# Patient Record
Sex: FEMALE | Race: BLACK OR AFRICAN AMERICAN | NOT HISPANIC OR LATINO | Employment: OTHER | URBAN - METROPOLITAN AREA
[De-identification: names, ages, dates, MRNs, and addresses within clinical notes are randomized per-mention and may not be internally consistent; named-entity substitution may affect disease eponyms.]

---

## 2021-10-26 RX ORDER — RNA INGREDIENT BNT-162B2 0.23 G/1.8ML
INJECTION, SUSPENSION INTRAMUSCULAR
Qty: 0.3 ML | Refills: 2 | OUTPATIENT
Start: 2021-10-26

## 2022-12-27 ENCOUNTER — OFFICE VISIT (OUTPATIENT)
Dept: FAMILY MEDICINE CLINIC | Facility: CLINIC | Age: 72
End: 2022-12-27

## 2022-12-27 VITALS
TEMPERATURE: 98.1 F | HEART RATE: 68 BPM | HEIGHT: 63 IN | BODY MASS INDEX: 46.42 KG/M2 | WEIGHT: 262 LBS | DIASTOLIC BLOOD PRESSURE: 88 MMHG | SYSTOLIC BLOOD PRESSURE: 142 MMHG | OXYGEN SATURATION: 99 %

## 2022-12-27 DIAGNOSIS — Z12.11 SCREEN FOR COLON CANCER: ICD-10-CM

## 2022-12-27 DIAGNOSIS — Z86.39 HISTORY OF HYPOTHYROIDISM: ICD-10-CM

## 2022-12-27 DIAGNOSIS — E66.01 MORBID (SEVERE) OBESITY DUE TO EXCESS CALORIES: ICD-10-CM

## 2022-12-27 DIAGNOSIS — E11.65 TYPE 2 DIABETES MELLITUS WITH HYPERGLYCEMIA, WITHOUT LONG-TERM CURRENT USE OF INSULIN: ICD-10-CM

## 2022-12-27 DIAGNOSIS — Z23 FLU VACCINE NEED: ICD-10-CM

## 2022-12-27 DIAGNOSIS — E78.5 HYPERLIPIDEMIA, UNSPECIFIED HYPERLIPIDEMIA TYPE: ICD-10-CM

## 2022-12-27 DIAGNOSIS — Z76.89 ENCOUNTER TO ESTABLISH CARE: Primary | ICD-10-CM

## 2022-12-27 DIAGNOSIS — I10 PRIMARY HYPERTENSION: ICD-10-CM

## 2022-12-27 PROCEDURE — 99204 OFFICE O/P NEW MOD 45 MIN: CPT

## 2022-12-27 PROCEDURE — 90662 IIV NO PRSV INCREASED AG IM: CPT

## 2022-12-27 PROCEDURE — G0008 ADMIN INFLUENZA VIRUS VAC: HCPCS

## 2022-12-27 RX ORDER — LOSARTAN POTASSIUM AND HYDROCHLOROTHIAZIDE 25; 100 MG/1; MG/1
1 TABLET ORAL DAILY
COMMUNITY
Start: 2022-12-07

## 2022-12-27 RX ORDER — ASPIRIN 81 MG/1
81 TABLET, COATED ORAL DAILY
COMMUNITY
Start: 2022-12-08

## 2022-12-27 RX ORDER — AMLODIPINE BESYLATE 10 MG/1
10 TABLET ORAL DAILY
COMMUNITY
Start: 2022-12-08

## 2022-12-27 RX ORDER — POTASSIUM CHLORIDE 750 MG/1
CAPSULE, EXTENDED RELEASE ORAL
COMMUNITY
Start: 2022-12-16

## 2022-12-27 RX ORDER — MELATONIN
COMMUNITY
Start: 2022-12-16

## 2022-12-27 RX ORDER — CARVEDILOL 25 MG/1
TABLET ORAL
COMMUNITY
Start: 2022-12-16

## 2022-12-27 RX ORDER — PRAVASTATIN SODIUM 40 MG
40 TABLET ORAL DAILY
COMMUNITY
Start: 2022-12-13

## 2022-12-27 NOTE — ASSESSMENT & PLAN NOTE
Hypertension is newly identified.  Continue current treatment regimen.  Blood pressure will be reassessed in 4 weeks.

## 2022-12-27 NOTE — PROGRESS NOTES
Chief Complaint  Chief Complaint   Patient presents with   • Diabetes       Subjective      Claudia Underwood presents to [facility]  History of Present Illness  Patient presents today to establish care. She has not had a PCP in several years. She does see a cardiologist, Dr. Keller, who manages her hypertension. He told her that she has heart palpitations that are treated with carvedilol 25 mg daily. She also has hyperlipidemia that is well managed on pravastatin 40 mg daily. Blood pressure is additionally managed on amlodipine 10 mg daily, hyzaar 100-25 mg daily.     She has been told that she is diabetic in the past and was prescribed Metformin but she is not compliant with taking it as it causes GI upset.  She states that cardiology had checked her A1c at 1 time and it was little over 6.    She has also been told in the past that she has hypothyroidism and was previously treated with levothyroxine. She is not currently taking any thyroid medication as she was told that she no longer needed to.    She is not agreeable to a mammogram at this time. She is also not agreeable a colonoscopy but will do a cologuard.     Patient verbalizes a general distrust in healthcare providers following the death of her son who was diagnosed with colon cancer and quickly deteriorated and subsequently  about 2 years ago.  Today we discussed several screenings including mammogram for breast cancer screening and a colonoscopy for colon cancer screening.  Patient is agreeable to completing a Cologuard at this time.    Objective     Medical History:  Past Medical History:   Diagnosis Date   • Allergic    • Arthritis    • Cataract    • Diabetes mellitus (HCC)    • Hypertension      History reviewed. No pertinent surgical history.   Social History     Tobacco Use   • Smoking status: Never     Passive exposure: Never   • Smokeless tobacco: Never   Substance Use Topics   • Alcohol use: Never   • Drug use:  "Never     History reviewed. No pertinent family history.    Medications:  Prior to Admission medications    Medication Sig Start Date End Date Taking? Authorizing Provider   amLODIPine (NORVASC) 10 MG tablet Take 10 mg by mouth Daily. 12/8/22  Yes Tiff Patton MD   Aspirin Low Dose 81 MG EC tablet Take 81 mg by mouth Daily. 12/8/22  Yes Tiff Patton MD   carvedilol (COREG) 25 MG tablet TAKE ONE TABLET BY MOUTH TWICE DAILY @ 9AM & 5PM 12/16/22  Yes Tiff Patton MD   cholecalciferol (VITAMIN D3) 25 MCG (1000 UT) tablet TAKE ONE TABLET BY MOUTH DAILY AT 9 AM 12/16/22  Yes Tiff Patton MD   losartan-hydrochlorothiazide (HYZAAR) 100-25 MG per tablet Take 1 tablet by mouth Daily. 12/7/22  Yes Tiff Patton MD   potassium chloride (MICRO-K) 10 MEQ CR capsule TAKE ONE CAPSULE BY MOUTH TWICE DAILY @ 9AM & 5PM 12/16/22  Yes Tiff Patton MD   pravastatin (PRAVACHOL) 40 MG tablet Take 40 mg by mouth Daily. 12/13/22  Yes Tiff Patton MD        Allergies:   Patient has no known allergies.    Health Maintenance Due   Topic Date Due   • MAMMOGRAM  Never done   • URINE MICROALBUMIN  Never done   • DXA SCAN  Never done   • COLORECTAL CANCER SCREENING  Never done   • Pneumococcal Vaccine 65+ (1 - PCV) Never done   • TDAP/TD VACCINES (1 - Tdap) Never done   • ZOSTER VACCINE (1 of 2) Never done   • HEPATITIS C SCREENING  Never done   • ANNUAL WELLNESS VISIT  Never done   • DIABETIC FOOT EXAM  Never done   • HEMOGLOBIN A1C  Never done   • DIABETIC EYE EXAM  Never done   • COVID-19 Vaccine (4 - Booster) 12/20/2021   • LIPID PANEL  Never done         Vital Signs:   /88   Pulse 68   Temp 98.1 °F (36.7 °C)   Ht 160 cm (63\")   Wt 119 kg (262 lb)   SpO2 99%   BMI 46.41 kg/m²     Wt Readings from Last 3 Encounters:   12/27/22 119 kg (262 lb)     BP Readings from Last 3 Encounters:   12/27/22 142/88              Physical Exam  Vitals reviewed.   Constitutional:       " Appearance: Normal appearance. She is well-developed. She is morbidly obese.   HENT:      Head: Normocephalic and atraumatic.      Right Ear: External ear normal.      Left Ear: External ear normal.      Mouth/Throat:      Pharynx: No oropharyngeal exudate.   Eyes:      Conjunctiva/sclera: Conjunctivae normal.      Pupils: Pupils are equal, round, and reactive to light.   Cardiovascular:      Rate and Rhythm: Normal rate and regular rhythm.      Heart sounds: No murmur heard.    No friction rub. No gallop.   Pulmonary:      Effort: Pulmonary effort is normal.      Breath sounds: Normal breath sounds. No wheezing or rhonchi.   Abdominal:      General: Bowel sounds are normal. There is no distension.      Palpations: Abdomen is soft.      Tenderness: There is no abdominal tenderness.   Skin:     General: Skin is warm and dry.   Neurological:      Mental Status: She is alert and oriented to person, place, and time.      Cranial Nerves: No cranial nerve deficit.   Psychiatric:         Mood and Affect: Mood is anxious. Affect is tearful.         Behavior: Behavior normal.         Thought Content: Thought content normal.         Judgment: Judgment normal.          Result Review :    The following data was reviewed by HUA Senior on 12/27/22 at 16:57 EST:        No Images in the past 120 days found..                  Assessment and Plan    Diagnoses and all orders for this visit:    1. Encounter to establish care (Primary)  -     CBC & Differential; Future  -     Comprehensive metabolic panel; Future  -     Hemoglobin A1c; Future  -     Lipid Panel; Future    2. Primary hypertension  Assessment & Plan:  Hypertension is newly identified.  Continue current treatment regimen.  Blood pressure will be reassessed in 4 weeks.    Orders:  -     CBC & Differential; Future  -     Comprehensive metabolic panel; Future  -     Hemoglobin A1c; Future  -     Lipid Panel; Future    3. Hyperlipidemia, unspecified  hyperlipidemia type  Assessment & Plan:  Lipid abnormalities are newly identified.  Pharmacotherapy as ordered.  Lipids will be reassessed in 1 month.    Orders:  -     CBC & Differential; Future  -     Comprehensive metabolic panel; Future  -     Hemoglobin A1c; Future  -     Lipid Panel; Future    4. Type 2 diabetes mellitus with hyperglycemia, without long-term current use of insulin (HCC)  Assessment & Plan:  Diabetes is newly identified.   Continue current treatment regimen.  Diabetes will be reassessed in 1 month.    Orders:  -     CBC & Differential; Future  -     Comprehensive metabolic panel; Future  -     Hemoglobin A1c; Future  -     Lipid Panel; Future    5. History of hypothyroidism  -     TSH+Free T4; Future    6. Morbid (severe) obesity due to excess calories (HCC)  -     CBC & Differential; Future  -     Comprehensive metabolic panel; Future  -     Hemoglobin A1c; Future  -     Lipid Panel; Future  -     TSH+Free T4; Future    7. Screen for colon cancer  -     Cologuard - Stool, Per Rectum; Future    8. Flu vaccine need  -     Fluzone High-Dose 65+yrs (8157-3611)  Patient is adamant that she have her labs drawn at an outside facility.  She will have her labs drawn and will bring in a copy of the results when she has her follow-up visit with me in 1 month.  At that time we will recheck her blood pressure as it is slightly elevated today but she states she is feeling very stressed and nervous which may be contributing.  If her A1c remains elevated we will discuss alternative treatments as she is not compliant with metformin given significant GI distress.  At her follow-up visit we will again discuss other screening exams including a mammogram, DEXA scan.  Patient also needs to complete a Medicare annual wellness exam.        Smoking Cessation:    Claudia Underwood  reports that she has never smoked. She has never been exposed to tobacco smoke. She has never used smokeless tobacco.            Follow Up    Return in about 1 month (around 1/27/2023) for Recheck.  Patient was given instructions and counseling regarding her condition or for health maintenance advice. Please see specific information pulled into the AVS if appropriate.     Please note that portions of this note were completed with a voice recognition program.

## 2022-12-27 NOTE — ASSESSMENT & PLAN NOTE
Diabetes is newly identified.   Continue current treatment regimen.  Diabetes will be reassessed in 1 month.

## 2022-12-27 NOTE — ASSESSMENT & PLAN NOTE
Lipid abnormalities are newly identified.  Pharmacotherapy as ordered.  Lipids will be reassessed in 1 month.

## 2023-01-18 ENCOUNTER — LAB (OUTPATIENT)
Dept: LAB | Facility: HOSPITAL | Age: 73
End: 2023-01-18
Payer: MEDICARE

## 2023-01-18 DIAGNOSIS — Z86.39 HISTORY OF HYPOTHYROIDISM: ICD-10-CM

## 2023-01-18 DIAGNOSIS — Z76.89 ENCOUNTER TO ESTABLISH CARE: ICD-10-CM

## 2023-01-18 DIAGNOSIS — E66.01 MORBID (SEVERE) OBESITY DUE TO EXCESS CALORIES: ICD-10-CM

## 2023-01-18 DIAGNOSIS — E78.5 HYPERLIPIDEMIA, UNSPECIFIED HYPERLIPIDEMIA TYPE: ICD-10-CM

## 2023-01-18 DIAGNOSIS — E11.65 TYPE 2 DIABETES MELLITUS WITH HYPERGLYCEMIA, WITHOUT LONG-TERM CURRENT USE OF INSULIN: ICD-10-CM

## 2023-01-18 DIAGNOSIS — I10 PRIMARY HYPERTENSION: ICD-10-CM

## 2023-01-18 LAB
BASOPHILS # BLD AUTO: 0.02 10*3/MM3 (ref 0–0.2)
BASOPHILS NFR BLD AUTO: 0.4 % (ref 0–1.5)
DEPRECATED RDW RBC AUTO: 39.3 FL (ref 37–54)
EOSINOPHIL # BLD AUTO: 0.15 10*3/MM3 (ref 0–0.4)
EOSINOPHIL NFR BLD AUTO: 2.8 % (ref 0.3–6.2)
ERYTHROCYTE [DISTWIDTH] IN BLOOD BY AUTOMATED COUNT: 12 % (ref 12.3–15.4)
HCT VFR BLD AUTO: 38.7 % (ref 34–46.6)
HGB BLD-MCNC: 12.9 G/DL (ref 12–15.9)
IMM GRANULOCYTES # BLD AUTO: 0.01 10*3/MM3 (ref 0–0.05)
IMM GRANULOCYTES NFR BLD AUTO: 0.2 % (ref 0–0.5)
LYMPHOCYTES # BLD AUTO: 1.86 10*3/MM3 (ref 0.7–3.1)
LYMPHOCYTES NFR BLD AUTO: 34.5 % (ref 19.6–45.3)
MCH RBC QN AUTO: 29.9 PG (ref 26.6–33)
MCHC RBC AUTO-ENTMCNC: 33.3 G/DL (ref 31.5–35.7)
MCV RBC AUTO: 89.8 FL (ref 79–97)
MONOCYTES # BLD AUTO: 0.56 10*3/MM3 (ref 0.1–0.9)
MONOCYTES NFR BLD AUTO: 10.4 % (ref 5–12)
NEUTROPHILS NFR BLD AUTO: 2.79 10*3/MM3 (ref 1.7–7)
NEUTROPHILS NFR BLD AUTO: 51.7 % (ref 42.7–76)
NRBC BLD AUTO-RTO: 0 /100 WBC (ref 0–0.2)
PLATELET # BLD AUTO: 320 10*3/MM3 (ref 140–450)
PMV BLD AUTO: 9.8 FL (ref 6–12)
RBC # BLD AUTO: 4.31 10*6/MM3 (ref 3.77–5.28)
WBC NRBC COR # BLD: 5.39 10*3/MM3 (ref 3.4–10.8)

## 2023-01-18 PROCEDURE — 80061 LIPID PANEL: CPT

## 2023-01-18 PROCEDURE — 85025 COMPLETE CBC W/AUTO DIFF WBC: CPT

## 2023-01-18 PROCEDURE — 84443 ASSAY THYROID STIM HORMONE: CPT

## 2023-01-18 PROCEDURE — 80053 COMPREHEN METABOLIC PANEL: CPT

## 2023-01-18 PROCEDURE — 84439 ASSAY OF FREE THYROXINE: CPT

## 2023-01-18 PROCEDURE — 83036 HEMOGLOBIN GLYCOSYLATED A1C: CPT

## 2023-01-19 LAB
ALBUMIN SERPL-MCNC: 4.4 G/DL (ref 3.5–5.2)
ALBUMIN/GLOB SERPL: 1.5 G/DL
ALP SERPL-CCNC: 87 U/L (ref 39–117)
ALT SERPL W P-5'-P-CCNC: 6 U/L (ref 1–33)
ANION GAP SERPL CALCULATED.3IONS-SCNC: 7 MMOL/L (ref 5–15)
AST SERPL-CCNC: 11 U/L (ref 1–32)
BILIRUB SERPL-MCNC: 0.4 MG/DL (ref 0–1.2)
BUN SERPL-MCNC: 11 MG/DL (ref 8–23)
BUN/CREAT SERPL: 14.9 (ref 7–25)
CALCIUM SPEC-SCNC: 9.9 MG/DL (ref 8.6–10.5)
CHLORIDE SERPL-SCNC: 102 MMOL/L (ref 98–107)
CHOLEST SERPL-MCNC: 158 MG/DL (ref 0–200)
CO2 SERPL-SCNC: 29 MMOL/L (ref 22–29)
CREAT SERPL-MCNC: 0.74 MG/DL (ref 0.57–1)
EGFRCR SERPLBLD CKD-EPI 2021: 87.2 ML/MIN/1.73
GLOBULIN UR ELPH-MCNC: 2.9 GM/DL
GLUCOSE SERPL-MCNC: 115 MG/DL (ref 65–99)
HBA1C MFR BLD: 6.1 % (ref 4.8–5.6)
HDLC SERPL-MCNC: 55 MG/DL (ref 40–60)
LDLC SERPL CALC-MCNC: 91 MG/DL (ref 0–100)
LDLC/HDLC SERPL: 1.65 {RATIO}
POTASSIUM SERPL-SCNC: 4 MMOL/L (ref 3.5–5.2)
PROT SERPL-MCNC: 7.3 G/DL (ref 6–8.5)
SODIUM SERPL-SCNC: 138 MMOL/L (ref 136–145)
T4 FREE SERPL-MCNC: 1.19 NG/DL (ref 0.93–1.7)
TRIGL SERPL-MCNC: 61 MG/DL (ref 0–150)
TSH SERPL DL<=0.05 MIU/L-ACNC: 1.32 UIU/ML (ref 0.27–4.2)
VLDLC SERPL-MCNC: 12 MG/DL (ref 5–40)

## 2023-01-30 ENCOUNTER — TELEPHONE (OUTPATIENT)
Dept: FAMILY MEDICINE CLINIC | Facility: CLINIC | Age: 73
End: 2023-01-30

## 2023-01-30 NOTE — TELEPHONE ENCOUNTER
Caller: Claudia Underwood    Relationship to patient: Self    Best call back number:    [phone]      Patient is needing: PATIENT CALLED IN AND NEEDS AN APPOINTMENT FOR A FOLLOW UP WITH PCP. SHE DOES NOT WANT ANYTHING ON 2/1/2023 OR 2/14/2023 AND SHE ONLY WANTS THE AFTERNOON. SHE ALSO WANTS IT TO BE BEFORE 2/22/2023 . PATIENT SAID IT IS OKAY TO LEAVE A MESSAGE ON PHONE. IT IS FOR A FOLLOW UP VISIT

## 2023-02-06 ENCOUNTER — OFFICE VISIT (OUTPATIENT)
Dept: FAMILY MEDICINE CLINIC | Facility: CLINIC | Age: 73
End: 2023-02-06
Payer: MEDICARE

## 2023-02-06 VITALS
TEMPERATURE: 97.9 F | HEIGHT: 63 IN | HEART RATE: 62 BPM | WEIGHT: 254.4 LBS | SYSTOLIC BLOOD PRESSURE: 136 MMHG | OXYGEN SATURATION: 99 % | DIASTOLIC BLOOD PRESSURE: 88 MMHG | BODY MASS INDEX: 45.07 KG/M2

## 2023-02-06 DIAGNOSIS — H61.23 BILATERAL IMPACTED CERUMEN: ICD-10-CM

## 2023-02-06 DIAGNOSIS — Z12.11 SCREEN FOR COLON CANCER: ICD-10-CM

## 2023-02-06 DIAGNOSIS — I10 PRIMARY HYPERTENSION: ICD-10-CM

## 2023-02-06 DIAGNOSIS — E11.65 TYPE 2 DIABETES MELLITUS WITH HYPERGLYCEMIA, WITHOUT LONG-TERM CURRENT USE OF INSULIN: Primary | ICD-10-CM

## 2023-02-06 DIAGNOSIS — J30.9 ALLERGIC RHINITIS, UNSPECIFIED SEASONALITY, UNSPECIFIED TRIGGER: ICD-10-CM

## 2023-02-06 DIAGNOSIS — R05.1 ACUTE COUGH: ICD-10-CM

## 2023-02-06 PROCEDURE — 99214 OFFICE O/P EST MOD 30 MIN: CPT

## 2023-02-06 RX ORDER — FLUTICASONE PROPIONATE 50 MCG
2 SPRAY, SUSPENSION (ML) NASAL DAILY
Qty: 16 G | Refills: 1 | Status: SHIPPED | OUTPATIENT
Start: 2023-02-06

## 2023-02-06 RX ORDER — BENZONATATE 100 MG/1
100 CAPSULE ORAL 3 TIMES DAILY PRN
Qty: 20 CAPSULE | Refills: 0 | Status: SHIPPED | OUTPATIENT
Start: 2023-02-06

## 2023-02-06 RX ORDER — CETIRIZINE HYDROCHLORIDE 10 MG/1
10 TABLET ORAL DAILY
Qty: 30 TABLET | Refills: 2 | Status: SHIPPED | OUTPATIENT
Start: 2023-02-06

## 2023-02-06 NOTE — PROGRESS NOTES
Chief Complaint  Chief Complaint   Patient presents with   • Hypertension     1 month follow up        Subjective      Claudia Underwood presents to [facility]  History of Present Illness  Patient presents today for follow-up visit.  She was first seen about a month ago to establish care.  At that time she had slightly elevated blood pressure but reported that she was very nervous and anxious as she does not go to the doctor very frequently and has a general mistrust of medical providers.  Labs were drawn at that time which overall looked good.  Patient has a history of type 2 diabetes but states she is noncompliant with metformin as it causes significant GI distress.  Hemoglobin A1c was 6.10, lipid panel was normal.  No other significant lab results were noted.    Blood pressure is slightly improved today at 136/88.    Patient was agreeable to doing a Cologuard for colon cancer screening but that has not yet been done.  She request that she be sent another kit. She is also due a mammogram for breast cancer screening, DEXA scan, MAW.  Patient would like to hold off on mammogram and DEXA scan at this time. Patient states that her younger brother recently passed away.  She has been preoccupied with this and understandably upset.    Objective     Medical History:  Past Medical History:   Diagnosis Date   • Allergic    • Arthritis    • Cataract    • Diabetes mellitus (HCC)    • Hypertension      History reviewed. No pertinent surgical history.   Social History     Tobacco Use   • Smoking status: Never     Passive exposure: Never   • Smokeless tobacco: Never   Vaping Use   • Vaping Use: Never used   Substance Use Topics   • Alcohol use: Never   • Drug use: Never     History reviewed. No pertinent family history.    Medications:  Prior to Admission medications    Medication Sig Start Date End Date Taking? Authorizing Provider   amLODIPine (NORVASC) 10 MG tablet Take 10 mg by mouth Daily.  "12/8/22  Yes Tiff Patton MD   Aspirin Low Dose 81 MG EC tablet Take 81 mg by mouth Daily. 12/8/22  Yes Tiff Patton MD   carvedilol (COREG) 25 MG tablet TAKE ONE TABLET BY MOUTH TWICE DAILY @ 9AM & 5PM 12/16/22  Yes Tiff Patton MD   cholecalciferol (VITAMIN D3) 25 MCG (1000 UT) tablet TAKE ONE TABLET BY MOUTH DAILY AT 9 AM 12/16/22  Yes Tiff Patton MD   hydroCHLOROthiazide (HYDRODIURIL) 25 MG tablet Take 1 tablet by mouth Daily. Take one tablet by mouth daily at 9am with losartan 1/23/23  Yes Tiff Patton MD   losartan (COZAAR) 100 MG tablet Take 1 tablet by mouth Daily. Take one tablet by  Mouth daily at 9am 1/23/23  Yes Tiff Patton MD   losartan-hydrochlorothiazide (HYZAAR) 100-25 MG per tablet Take 1 tablet by mouth Daily. 12/7/22  Yes Tiff Patton MD   potassium chloride (MICRO-K) 10 MEQ CR capsule TAKE ONE CAPSULE BY MOUTH TWICE DAILY @ 9AM & 5PM 12/16/22  Yes Tiff Patton MD   pravastatin (PRAVACHOL) 40 MG tablet Take 40 mg by mouth Daily. 12/13/22  Yes Tiff Patton MD   metFORMIN (GLUCOPHAGE) 500 MG tablet Take 1 tablet by mouth Daily. Take one tablet by  Mouth daily at 9am 1/23/23   Tiff Patton MD        Allergies:   Patient has no known allergies.    Health Maintenance Due   Topic Date Due   • MAMMOGRAM  Never done   • URINE MICROALBUMIN  Never done   • DXA SCAN  Never done   • COLORECTAL CANCER SCREENING  Never done   • Pneumococcal Vaccine 65+ (1 - PCV) Never done   • ZOSTER VACCINE (1 of 2) Never done   • HEPATITIS C SCREENING  Never done   • ANNUAL WELLNESS VISIT  Never done   • DIABETIC FOOT EXAM  Never done   • DIABETIC EYE EXAM  Never done         Vital Signs:   /88   Pulse 62   Temp 97.9 °F (36.6 °C)   Ht 160 cm (63\")   Wt 115 kg (254 lb 6.4 oz)   SpO2 99%   BMI 45.06 kg/m²     Wt Readings from Last 3 Encounters:   02/06/23 115 kg (254 lb 6.4 oz)   12/27/22 119 kg (262 lb)     BP Readings from " Last 3 Encounters:   02/06/23 136/88   12/27/22 142/88       Class 3 Severe Obesity (BMI >=40). Obesity-related health conditions include the following: hypertension, diabetes mellitus and dyslipidemias. Obesity is improving with lifestyle modifications. BMI is is above average; BMI management plan is completed. We discussed portion control and increasing exercise.       Physical Exam  Vitals reviewed.   Constitutional:       Appearance: Normal appearance. She is well-developed. She is morbidly obese.   HENT:      Head: Normocephalic and atraumatic.      Right Ear: Decreased hearing noted. No tenderness. There is impacted cerumen. Tympanic membrane is not erythematous.      Left Ear: No tenderness. A middle ear effusion is present. There is impacted cerumen. Tympanic membrane is not erythematous.   Eyes:      Conjunctiva/sclera: Conjunctivae normal.      Pupils: Pupils are equal, round, and reactive to light.   Cardiovascular:      Rate and Rhythm: Normal rate and regular rhythm.      Heart sounds: No murmur heard.    No friction rub. No gallop.   Pulmonary:      Effort: Pulmonary effort is normal.      Breath sounds: Normal breath sounds. No wheezing or rhonchi.   Abdominal:      General: Bowel sounds are normal. There is no distension.      Palpations: Abdomen is soft.      Tenderness: There is no abdominal tenderness.   Skin:     General: Skin is warm and dry.   Neurological:      Mental Status: She is alert and oriented to person, place, and time.      Cranial Nerves: No cranial nerve deficit.   Psychiatric:         Mood and Affect: Mood and affect normal.         Behavior: Behavior normal.         Thought Content: Thought content normal.         Judgment: Judgment normal.          Result Review :    The following data was reviewed by HUA Senior on 02/06/23 at 14:05 EST:    Common labs    Common Labs 1/18/23 1/18/23 1/18/23 1/18/23    1447 1447 1447 1447   Glucose   115 (A)    BUN   11     Creatinine   0.74    Sodium   138    Potassium   4.0    Chloride   102    Calcium   9.9    Albumin   4.4    Total Bilirubin   0.4    Alkaline Phosphatase   87    AST (SGOT)   11    ALT (SGPT)   6    WBC 5.39      Hemoglobin 12.9      Hematocrit 38.7      Platelets 320      Total Cholesterol    158   Triglycerides    61   HDL Cholesterol    55   LDL Cholesterol     91   Hemoglobin A1C  6.10 (A)     (A) Abnormal value            Lipid Panel    Lipid Panel 1/18/23   Total Cholesterol 158   Triglycerides 61   HDL Cholesterol 55   VLDL Cholesterol 12   LDL Cholesterol  91   LDL/HDL Ratio 1.65                 No Images in the past 120 days found..                  Assessment and Plan    Diagnoses and all orders for this visit:    1. Type 2 diabetes mellitus with hyperglycemia, without long-term current use of insulin (HCC) (Primary)  Assessment & Plan:  Diabetes is improving with lifestyle modifications.   Continue current treatment regimen.  Dietary recommendations for ADA diet.  Diabetes will be reassessed in 3 months.    Orders:  -     MicroAlbumin, Urine, Random - Urine, Clean Catch; Future    2. Primary hypertension  Assessment & Plan:  Hypertension is improving with treatment.  Continue current treatment regimen.  Dietary sodium restriction.  Weight loss.  Continue current medications.  Blood pressure will be reassessed at the next regular appointment.      3. Screen for colon cancer  -     Cologuard - Stool, Per Rectum; Future    4. Allergic rhinitis, unspecified seasonality, unspecified trigger  -     cetirizine (zyrTEC) 10 MG tablet; Take 1 tablet by mouth Daily.  Dispense: 30 tablet; Refill: 2  -     fluticasone (FLONASE) 50 MCG/ACT nasal spray; 2 sprays into the nostril(s) as directed by provider Daily.  Dispense: 16 g; Refill: 1    5. Bilateral impacted cerumen  -     carbamide peroxide (Debrox) 6.5 % otic solution; Administer 5 drops into both ears 2 (Two) Times a Day.  Dispense: 15 mL; Refill: 1    6.  Acute cough  -     benzonatate (Tessalon Perles) 100 MG capsule; Take 1 capsule by mouth 3 (Three) Times a Day As Needed for Cough.  Dispense: 20 capsule; Refill: 0    Patient will follow-up with me in 1 month for Medicare annual wellness exam.  Microalbumin is needed for assessment of kidney function given underlying type 2 diabetes.  Patient will return to clinic as a walk-in to provide a urine sample for this to be done.  Patient is agreeable to completing Cologuard kit but states that she needs a new one as she has misplaced the previously ordered one. She will complete mammogram and DEXA scan at a later time.  I discussed with her the need for shingles vaccine and pneumonia vaccine.  Patient declines both at this time.        Smoking Cessation:    Claudia Underwood  reports that she has never smoked. She has never been exposed to tobacco smoke. She has never used smokeless tobacco.          Follow Up   Return in about 1 month (around 3/6/2023) for Medicare Wellness.  Patient was given instructions and counseling regarding her condition or for health maintenance advice. Please see specific information pulled into the AVS if appropriate.     Please note that portions of this note were completed with a voice recognition program.

## 2023-02-06 NOTE — ASSESSMENT & PLAN NOTE
Diabetes is improving with lifestyle modifications.   Continue current treatment regimen.  Dietary recommendations for ADA diet.  Diabetes will be reassessed in 3 months.

## 2023-03-31 ENCOUNTER — TELEPHONE (OUTPATIENT)
Dept: FAMILY MEDICINE CLINIC | Facility: CLINIC | Age: 73
End: 2023-03-31
Payer: MEDICARE

## 2023-03-31 NOTE — TELEPHONE ENCOUNTER
Patient missed appointment on 03/28/2023 @ 3:00 with Kimberlee Cohen. Called first number in chart. Left message explaining policy concerning missed appointments and same day cancellations.

## 2023-03-31 NOTE — TELEPHONE ENCOUNTER
Patient missed appointment on 03/28/2023 @ 3:00 with Kimberlee Cohen. Called second number in chart. No answer. Second attempt. Sending letter.

## 2023-04-29 DIAGNOSIS — J30.9 ALLERGIC RHINITIS, UNSPECIFIED SEASONALITY, UNSPECIFIED TRIGGER: ICD-10-CM

## 2023-05-01 ENCOUNTER — TELEPHONE (OUTPATIENT)
Dept: FAMILY MEDICINE CLINIC | Facility: CLINIC | Age: 73
End: 2023-05-01

## 2023-05-01 RX ORDER — FLUTICASONE PROPIONATE 50 MCG
SPRAY, SUSPENSION (ML) NASAL
Qty: 16 G | Refills: 1 | Status: SHIPPED | OUTPATIENT
Start: 2023-05-01

## 2023-05-01 NOTE — TELEPHONE ENCOUNTER
Caller: John Underwooda    Relationship: Self    Best call back number: [phone]    What medication are you requesting: FOR A URINARY TRACT INFECTION    What are your current symptoms: FREQUENT URGE TO URINATE, SLIGHT PAIN IN PELVIC REGION    How long have you been experiencing symptoms: 3-4 DAYS    Have you had these symptoms before:    [x] Yes  [] No    Have you been treated for these symptoms before:   [x] Yes  [] No    If a prescription is needed, what is your preferred pharmacy and phone number: [facility] [place] [address] [address] - [phone]  - [phone] FX     Additional notes: PATIENT WOULD LIKE A MEDICATION PRESCRIBED FOR A URINARY TRACT INFECTION. PLEASE CALL PATIENT TO ADVISE.

## 2023-05-01 NOTE — TELEPHONE ENCOUNTER
Caller: Claudia Underwood    Relationship: Self    Best call back number: [phone]       What was the call regarding:     THE PATIENT CALLED FOR AN UPDATE ON THE REQUEST

## 2023-05-02 NOTE — TELEPHONE ENCOUNTER
Called PT to schedule appt for UTI, PT stated that she went to urgent care yesterday afternoon and they did a UA and it was normal, PT stated she would call office is symptoms worsened

## 2023-05-11 PROBLEM — E03.9 HYPOTHYROIDISM: Status: ACTIVE | Noted: 2023-05-11

## 2023-05-11 PROBLEM — F41.1 GENERALIZED ANXIETY DISORDER: Status: ACTIVE | Noted: 2023-05-11

## 2023-05-11 PROBLEM — E88.81 METABOLIC SYNDROME: Status: ACTIVE | Noted: 2023-05-11

## 2023-05-11 PROBLEM — K21.9 GASTROESOPHAGEAL REFLUX DISEASE: Status: ACTIVE | Noted: 2023-05-11

## 2023-05-11 PROBLEM — E87.6 HYPOKALEMIA: Status: ACTIVE | Noted: 2023-05-11

## 2023-05-11 PROBLEM — E88.810 METABOLIC SYNDROME: Status: ACTIVE | Noted: 2023-05-11

## 2023-05-12 ENCOUNTER — OFFICE VISIT (OUTPATIENT)
Dept: FAMILY MEDICINE CLINIC | Facility: CLINIC | Age: 73
End: 2023-05-12
Payer: MEDICARE

## 2023-05-12 VITALS
SYSTOLIC BLOOD PRESSURE: 122 MMHG | DIASTOLIC BLOOD PRESSURE: 82 MMHG | WEIGHT: 255.9 LBS | OXYGEN SATURATION: 98 % | HEART RATE: 61 BPM | TEMPERATURE: 98.5 F | HEIGHT: 63 IN | BODY MASS INDEX: 45.34 KG/M2

## 2023-05-12 DIAGNOSIS — H60.311 ACUTE DIFFUSE OTITIS EXTERNA OF RIGHT EAR: ICD-10-CM

## 2023-05-12 DIAGNOSIS — I10 PRIMARY HYPERTENSION: Primary | ICD-10-CM

## 2023-05-12 DIAGNOSIS — E66.01 CLASS 3 SEVERE OBESITY DUE TO EXCESS CALORIES WITH BODY MASS INDEX (BMI) OF 45.0 TO 49.9 IN ADULT, UNSPECIFIED WHETHER SERIOUS COMORBIDITY PRESENT: ICD-10-CM

## 2023-05-12 DIAGNOSIS — E78.5 HYPERLIPIDEMIA, UNSPECIFIED HYPERLIPIDEMIA TYPE: ICD-10-CM

## 2023-05-12 DIAGNOSIS — E11.65 TYPE 2 DIABETES MELLITUS WITH HYPERGLYCEMIA, WITHOUT LONG-TERM CURRENT USE OF INSULIN: ICD-10-CM

## 2023-05-12 DIAGNOSIS — F41.1 GENERALIZED ANXIETY DISORDER: ICD-10-CM

## 2023-05-12 LAB — ALBUMIN UR-MCNC: <1.2 MG/DL

## 2023-05-12 PROCEDURE — 82043 UR ALBUMIN QUANTITATIVE: CPT

## 2023-05-12 RX ORDER — HYDROXYZINE HYDROCHLORIDE 25 MG/1
25 TABLET, FILM COATED ORAL 3 TIMES DAILY PRN
Qty: 60 TABLET | Refills: 1 | Status: SHIPPED | OUTPATIENT
Start: 2023-05-12

## 2023-05-12 RX ORDER — HYDROXYZINE HYDROCHLORIDE 25 MG/1
25 TABLET, FILM COATED ORAL 3 TIMES DAILY PRN
Qty: 60 TABLET | Refills: 1 | Status: SHIPPED | OUTPATIENT
Start: 2023-05-12 | End: 2023-05-12

## 2023-05-12 RX ORDER — OFLOXACIN 3 MG/ML
5 SOLUTION AURICULAR (OTIC) DAILY
Qty: 10 ML | Refills: 0 | Status: SHIPPED | OUTPATIENT
Start: 2023-05-12

## 2023-05-12 NOTE — ASSESSMENT & PLAN NOTE
Patient's (Body mass index is 45.33 kg/m².) indicates that they are morbidly/severely obese (BMI > 40 or > 35 with obesity - related health condition) with health conditions that include hypertension, diabetes mellitus and dyslipidemias . Weight is unchanged. BMI  is above average; BMI management plan is completed. We discussed portion control and increasing exercise.

## 2023-05-12 NOTE — PROGRESS NOTES
The ABCs of the Annual Wellness Visit  Subsequent Medicare Wellness Visit    Subjective    Claudia Underwood is a 73 y.o. female who presents for a Subsequent Medicare Wellness Visit.    The following portions of the patient's history were reviewed and   updated as appropriate: allergies, current medications, past family history, past medical history, past social history, past surgical history and problem list.    Compared to one year ago, the patient feels her physical   health is better.    Compared to one year ago, the patient feels her mental   health is worse.    Recent Hospitalizations:  She was not admitted to the hospital during the last year.       Current Medical Providers:  Patient Care Team:  Kimberlee Cohen APRN as PCP - General (Family Medicine)    Outpatient Medications Prior to Visit   Medication Sig Dispense Refill   • amLODIPine (NORVASC) 10 MG tablet Take 1 tablet by mouth Daily.     • Aspirin Low Dose 81 MG EC tablet Take 1 tablet by mouth Daily.     • carvedilol (COREG) 25 MG tablet TAKE ONE TABLET BY MOUTH TWICE DAILY @ 9AM & 5PM     • cetirizine (zyrTEC) 10 MG tablet Take 1 tablet by mouth Daily. 30 tablet 2   • cholecalciferol (VITAMIN D3) 25 MCG (1000 UT) tablet TAKE ONE TABLET BY MOUTH DAILY AT 9 AM     • fluticasone (FLONASE) 50 MCG/ACT nasal spray inhale 2 sprays IN EACH NOSTRIL EVERY DAY 16 g 1   • losartan-hydrochlorothiazide (HYZAAR) 100-25 MG per tablet Take 1 tablet by mouth Daily.     • potassium chloride (MICRO-K) 10 MEQ CR capsule TAKE ONE CAPSULE BY MOUTH TWICE DAILY @ 9AM & 5PM     • pravastatin (PRAVACHOL) 40 MG tablet Take 1 tablet by mouth Daily.     • carbamide peroxide (Debrox) 6.5 % otic solution Administer 5 drops into both ears 2 (Two) Times a Day. (Patient not taking: Reported on 5/12/2023) 15 mL 1   • benzonatate (Tessalon Perles) 100 MG capsule Take 1 capsule by mouth 3 (Three) Times a Day As Needed for Cough. (Patient not taking: Reported on 5/12/2023) 20  "capsule 0   • hydroCHLOROthiazide (HYDRODIURIL) 25 MG tablet Take 1 tablet by mouth Daily. Take one tablet by mouth daily at 9am with losartan (Patient not taking: Reported on 5/12/2023)     • losartan (COZAAR) 100 MG tablet Take 1 tablet by mouth Daily. Take one tablet by  Mouth daily at 9am (Patient not taking: Reported on 5/12/2023)     • metFORMIN (GLUCOPHAGE) 500 MG tablet TAKE ONE TABLET BY MOUTH DAILY AT 9 AM (Patient not taking: Reported on 5/12/2023)     • olopatadine (PATANOL) 0.1 % ophthalmic solution INSTILL 1 DROP TWICE DAILY IN BOTH EYES (Patient not taking: Reported on 5/12/2023)       No facility-administered medications prior to visit.       No opioid medication identified on active medication list. I have reviewed chart for other potential  high risk medication/s and harmful drug interactions in the elderly.          Aspirin is on active medication list. Aspirin use is indicated based on review of current medical condition/s. Pros and cons of this therapy have been discussed today. Benefits of this medication outweigh potential harm.  Patient has been encouraged to continue taking this medication.  .      Patient Active Problem List   Diagnosis   • Primary hypertension   • Hyperlipidemia   • Type 2 diabetes mellitus with hyperglycemia, without long-term current use of insulin   • History of hypothyroidism   • Morbid (severe) obesity due to excess calories   • Allergic rhinitis   • Metabolic syndrome   • Hypothyroidism   • Hypokalemia   • Generalized anxiety disorder   • Gastroesophageal reflux disease     Advance Care Planning   Advance Care Planning     Advance Directive is not on file.  ACP discussion was held with the patient during this visit. Patient does not have an advance directive, declines further assistance.     Objective    Vitals:    05/12/23 1508   BP: 122/82   Pulse: 61   Temp: 98.5 °F (36.9 °C)   SpO2: 98%   Weight: 116 kg (255 lb 14.4 oz)   Height: 160 cm (63\")   PainSc: 0-No pain " "    Estimated body mass index is 45.33 kg/m² as calculated from the following:    Height as of this encounter: 160 cm (63\").    Weight as of this encounter: 116 kg (255 lb 14.4 oz).    Class 3 Severe Obesity (BMI >=40). Obesity-related health conditions include the following: hypertension, diabetes mellitus and dyslipidemias. Obesity is unchanged. BMI is is above average; BMI management plan is completed. We discussed portion control and increasing exercise.      Does the patient have evidence of cognitive impairment? No          HEALTH RISK ASSESSMENT    Smoking Status:  Social History     Tobacco Use   Smoking Status Never   • Passive exposure: Never   Smokeless Tobacco Never     Alcohol Consumption:  Social History     Substance and Sexual Activity   Alcohol Use Never     Fall Risk Screen:    SIDNEY Fall Risk Assessment was completed, and patient is at LOW risk for falls.Assessment completed on:2023    Depression Screenin/12/2023     3:06 PM   PHQ-2/PHQ-9 Depression Screening   Little Interest or Pleasure in Doing Things 0-->not at all   PHQ-9: Brief Depression Severity Measure Score 0       Health Habits and Functional and Cognitive Screenin/12/2023     3:06 PM   Functional & Cognitive Status   Do you have difficulty preparing food and eating? No   Do you have difficulty bathing yourself, getting dressed or grooming yourself? No   Do you have difficulty using the toilet? No   Do you have difficulty moving around from place to place? No   Do you have trouble with steps or getting out of a bed or a chair? No   Current Diet Well Balanced Diet   Dental Exam Not up to date   Eye Exam Up to date   Exercise (times per week) 0 times per week   Current Exercises Include No Regular Exercise   Do you need help using the phone?  No   Are you deaf or do you have serious difficulty hearing?  No   Do you need help with transportation? No   Do you need help shopping? No   Do you need help preparing meals? "  No   Do you need help with housework?  No   Do you need help with laundry? No   Do you need help taking your medications? No   Do you need help managing money? No   Do you ever drive or ride in a car without wearing a seat belt? No   Have you felt unusual stress, anger or loneliness in the last month? No   Who do you live with? Alone   If you need help, do you have trouble finding someone available to you? No   Have you been bothered in the last four weeks by sexual problems? No   Do you have difficulty concentrating, remembering or making decisions? No       Age-appropriate Screening Schedule:  Refer to the list below for future screening recommendations based on patient's age, sex and/or medical conditions. Orders for these recommended tests are listed in the plan section. The patient has been provided with a written plan.    Health Maintenance   Topic Date Due   • MAMMOGRAM  Never done   • URINE MICROALBUMIN  Never done   • DXA SCAN  Never done   • COLORECTAL CANCER SCREENING  Never done   • HEPATITIS C SCREENING  Never done   • ANNUAL WELLNESS VISIT  Never done   • DIABETIC FOOT EXAM  Never done   • DIABETIC EYE EXAM  Never done   • COVID-19 Vaccine (4 - Booster for Pfizer series) 02/23/2024 (Originally 8/26/2022)   • TDAP/TD VACCINES (1 - Tdap) 02/23/2024 (Originally 2/15/1969)   • Pneumococcal Vaccine 65+ (1 - PCV) 05/11/2024 (Originally 2/15/1956)   • ZOSTER VACCINE (1 of 2) 05/12/2024 (Originally 2/15/2002)   • HEMOGLOBIN A1C  07/18/2023   • INFLUENZA VACCINE  08/01/2023   • LIPID PANEL  01/18/2024                  CMS Preventative Services Quick Reference  Risk Factors Identified During Encounter  Inadequate Social Support, Isolation, Loneliness, Lack of Transportation, Financial Difficulties, or Caregiver Stress: going through a lot of stress related to loss of her son, recent loss of her brother, and recent illness of her daughter  Inactivity/Sedentary: Patient was advised to exercise at least 150  minutes a week per CDC recommendations.  The above risks/problems have been discussed with the patient.  Pertinent information has been shared with the patient in the After Visit Summary.  An After Visit Summary and PPPS were made available to the patient.    Follow Up:   Next Medicare Wellness visit to be scheduled in 1 year.       Additional E&M Note during same encounter follows:  Patient has multiple medical problems which are significant and separately identifiable that require additional work above and beyond the Medicare Wellness Visit.      Chief Complaint  Medicare Wellness-subsequent, Hypertension (1 month follow up ), and Earache (RT ear)    Subjective        HPI  Claudia Underwood is also being seen today for hypertension, hyperlipidemia, type 2 diabetes and right ear pain    Hypertension: Improved on current medications of HCTZ 25 mg daily, Losartan 100 mg daily.  Blood pressure today is 122/82.  She denies any chest pain, headaches, swelling, blurred vision.    HLD: Improving on pravastatin 40 mg daily.     DM type 2: A1C stable at 6.10.  She is no longer taking metformin as this was causing significant GI distress.  A1c was only slightly elevated so patient advised to discontinue use of metformin.    Anxiety: Patient has recently been under a lot of stress and feels very tense and anxious due to illness of her daughter.  She lost her son not long ago to colon cancer and her brother  recently as well and now her daughter is having multiple health issues.  The patient declines any symptoms of feeling down or depressed, just states that she feels overwhelmed with everything. She took xanax in the past after the death of her  and that helped with her anxiety but reportedly made her feel more depressed.     Patient was previously ordered Cologuard but did not get it.  It was reordered but the order was canceled.  She is asking for another Cologuard kit to complete colon cancer screening, especially  "since her son recently  of colon cancer.         Objective   Vital Signs:  /82   Pulse 61   Temp 98.5 °F (36.9 °C)   Ht 160 cm (63\")   Wt 116 kg (255 lb 14.4 oz)   SpO2 98%   BMI 45.33 kg/m²     Physical Exam  Vitals reviewed.   Constitutional:       Appearance: Normal appearance. She is well-developed. She is morbidly obese.   HENT:      Head: Normocephalic and atraumatic.      Right Ear: Tenderness present. A middle ear effusion is present.      Left Ear: A middle ear effusion is present.      Ears:      Comments: Erythematous right ear canal with tenderness upon inspection  Eyes:      Conjunctiva/sclera: Conjunctivae normal.      Pupils: Pupils are equal, round, and reactive to light.   Cardiovascular:      Rate and Rhythm: Normal rate and regular rhythm.      Heart sounds: No murmur heard.    No friction rub. No gallop.   Pulmonary:      Effort: Pulmonary effort is normal.      Breath sounds: Normal breath sounds. No wheezing or rhonchi.   Abdominal:      General: Bowel sounds are normal. There is no distension.      Palpations: Abdomen is soft.      Tenderness: There is no abdominal tenderness.   Skin:     General: Skin is warm and dry.   Neurological:      Mental Status: She is alert and oriented to person, place, and time.      Cranial Nerves: No cranial nerve deficit.   Psychiatric:         Mood and Affect: Mood and affect normal.         Behavior: Behavior normal.         Thought Content: Thought content normal.         Judgment: Judgment normal.            Common labs        2023    14:47   Common Labs   Glucose 115     BUN 11     Creatinine 0.74     Sodium 138     Potassium 4.0     Chloride 102     Calcium 9.9     Albumin 4.4     Total Bilirubin 0.4     Alkaline Phosphatase 87     AST (SGOT) 11     ALT (SGPT) 6     WBC 5.39     Hemoglobin 12.9     Hematocrit 38.7     Platelets 320     Total Cholesterol 158     Triglycerides 61     HDL Cholesterol 55     LDL Cholesterol  91   "   Hemoglobin A1C 6.10                  Assessment and Plan   Diagnoses and all orders for this visit:    1. Primary hypertension (Primary)  Assessment & Plan:  Hypertension is improving with treatment.  Continue current treatment regimen.  Dietary sodium restriction.  Weight loss.  Continue current medications.  Blood pressure will be reassessed at the next regular appointment.      2. Hyperlipidemia, unspecified hyperlipidemia type  Assessment & Plan:  Lipid abnormalities are improving with treatment.  Nutritional counseling was provided. and Pharmacotherapy as ordered.  Lipids will be reassessed in 3 months.      3. Type 2 diabetes mellitus with hyperglycemia, without long-term current use of insulin  Assessment & Plan:  Diabetes is improving with lifestyle modifications.   Continue current treatment regimen.  Dietary recommendations for ADA diet.  Diabetes will be reassessed in 3 months.    Orders:  -     MicroAlbumin, Urine, Random - Urine, Clean Catch    4. Generalized anxiety disorder  -     hydrOXYzine (ATARAX) 25 MG tablet; Take 1 tablet by mouth 3 (Three) Times a Day As Needed for Anxiety.  Dispense: 60 tablet; Refill: 1    5. Class 3 severe obesity due to excess calories with body mass index (BMI) of 45.0 to 49.9 in adult, unspecified whether serious comorbidity present  Assessment & Plan:  Patient's (Body mass index is 45.33 kg/m².) indicates that they are morbidly/severely obese (BMI > 40 or > 35 with obesity - related health condition) with health conditions that include hypertension, diabetes mellitus and dyslipidemias . Weight is unchanged. BMI  is above average; BMI management plan is completed. We discussed portion control and increasing exercise.       6. Acute diffuse otitis externa of right ear  -     ofloxacin (FLOXIN) 0.3 % otic solution; Administer 5 drops to the right ear Daily.  Dispense: 10 mL; Refill: 0    Other orders  -     Discontinue: hydrOXYzine (ATARAX) 25 MG tablet; Take 1 tablet by  mouth 3 (Three) Times a Day As Needed for Itching.  Dispense: 60 tablet; Refill: 1    Patient was encouraged to restart use of daily Zyrtec and Flonase for congestion and bilateral ear effusions.  For otitis externa patient is prescribed ofloxacin.  She will follow-up with me in 3 months to continue monitoring these issues and at that time we will repeat labs to ensure that hemoglobin A1c remains stable without medical management as well as stable lipids on current dose of simvastatin.  Patient encouraged to continue all medications at current doses.  For anxiety patient will begin use of hydroxyzine 25 mg 3 times daily as needed.  If this is not effective for her she was encouraged to schedule follow-up visit for further evaluation and possible escalation of anxiety medication.         Follow Up   Return in about 3 months (around 8/12/2023) for Next scheduled follow up.  Patient was given instructions and counseling regarding her condition or for health maintenance advice. Please see specific information pulled into the AVS if appropriate.

## 2023-05-28 DIAGNOSIS — J30.9 ALLERGIC RHINITIS, UNSPECIFIED SEASONALITY, UNSPECIFIED TRIGGER: ICD-10-CM

## 2023-05-30 RX ORDER — CETIRIZINE HYDROCHLORIDE 10 MG/1
TABLET ORAL
Qty: 30 TABLET | Refills: 2 | Status: SHIPPED | OUTPATIENT
Start: 2023-05-30

## 2023-08-23 ENCOUNTER — OFFICE VISIT (OUTPATIENT)
Dept: FAMILY MEDICINE CLINIC | Facility: CLINIC | Age: 73
End: 2023-08-23
Payer: MEDICARE

## 2023-08-23 VITALS
WEIGHT: 252 LBS | OXYGEN SATURATION: 99 % | BODY MASS INDEX: 44.65 KG/M2 | TEMPERATURE: 97.1 F | HEIGHT: 63 IN | HEART RATE: 60 BPM

## 2023-08-23 DIAGNOSIS — H61.23 BILATERAL IMPACTED CERUMEN: ICD-10-CM

## 2023-08-23 DIAGNOSIS — R06.2 WHEEZING: ICD-10-CM

## 2023-08-23 DIAGNOSIS — J40 BRONCHITIS: ICD-10-CM

## 2023-08-23 DIAGNOSIS — F41.1 GENERALIZED ANXIETY DISORDER: ICD-10-CM

## 2023-08-23 DIAGNOSIS — I10 PRIMARY HYPERTENSION: Primary | ICD-10-CM

## 2023-08-23 DIAGNOSIS — E11.65 TYPE 2 DIABETES MELLITUS WITH HYPERGLYCEMIA, WITHOUT LONG-TERM CURRENT USE OF INSULIN: ICD-10-CM

## 2023-08-23 DIAGNOSIS — E78.5 HYPERLIPIDEMIA, UNSPECIFIED HYPERLIPIDEMIA TYPE: ICD-10-CM

## 2023-08-23 DIAGNOSIS — Z12.31 BREAST CANCER SCREENING BY MAMMOGRAM: ICD-10-CM

## 2023-08-23 DIAGNOSIS — R09.81 NASAL CONGESTION: ICD-10-CM

## 2023-08-23 RX ORDER — LOSARTAN POTASSIUM 100 MG/1
TABLET ORAL
COMMUNITY
Start: 2023-08-14

## 2023-08-23 RX ORDER — POTASSIUM CHLORIDE 750 MG/1
TABLET, EXTENDED RELEASE ORAL
COMMUNITY
Start: 2023-08-16

## 2023-08-23 RX ORDER — AZITHROMYCIN 250 MG/1
TABLET, FILM COATED ORAL
Qty: 6 TABLET | Refills: 0 | Status: SHIPPED | OUTPATIENT
Start: 2023-08-23

## 2023-08-23 RX ORDER — PREDNISONE 20 MG/1
40 TABLET ORAL DAILY
Qty: 10 TABLET | Refills: 0 | Status: SHIPPED | OUTPATIENT
Start: 2023-08-23 | End: 2023-08-28

## 2023-08-23 RX ORDER — HYDROCHLOROTHIAZIDE 25 MG/1
TABLET ORAL
COMMUNITY
Start: 2023-08-14

## 2023-08-23 NOTE — PROGRESS NOTES
Chief Complaint  Chief Complaint   Patient presents with    Wheezing    Nasal Congestion       Subjective      Claudia Underwood presents to [facility]    The patient is a 73-year-old female who presents today for wheezing and nasal congestion as well as follow-up visit.    The patient reports that she has been experiencing wheezing recently. She has recently moved into a new apartment and the vent over her bed blows cold air on her. She denies fever or chills. She notes experiencing a random dry cough occasionally. She denies nasal congestion. She denies pneumonia in the past. She has no allergies to medications. She denies use of inhalers or breathing treatments. She is currently a non-smoker and notes that she quit smoking when she was 52-years-old. She notes that she does not feel ill at this time. She denies shortness of breath. She feels like the wheezing could be attributed to allergies because her eyes have been watering as well.     She adds that she has been experiencing decreased hearing lately. She has been using the drops that were prescribed in the past.     Her previous laboratory tests performed in 01/20223 revealed a hemoglobin A1c of 6.1 percent. She has been taking her medications as prescribed. She has lost 3 more pounds since her visit in 05/2023.    She denies swelling in her feet and ankles. She is still taking her potassium supplements twice daily that she was prescribed in the past for palpitations.     Objective     Medical History:  Past Medical History:   Diagnosis Date    Allergic     Arthritis     Cataract     Diabetes mellitus     Hypertension      History reviewed. No pertinent surgical history.   Social History     Tobacco Use    Smoking status: Never     Passive exposure: Never    Smokeless tobacco: Never   Vaping Use    Vaping Use: Never used   Substance Use Topics    Alcohol use: Never    Drug use: Never     History reviewed. No pertinent family  history.    Medications:  Prior to Admission medications    Medication Sig Start Date End Date Taking? Authorizing Provider   amLODIPine (NORVASC) 10 MG tablet Take 1 tablet by mouth Daily. 12/8/22  Yes Tiff Patton MD   Aspirin Low Dose 81 MG EC tablet Take 1 tablet by mouth Daily. 12/8/22  Yes Tiff Patton MD   carvedilol (COREG) 25 MG tablet TAKE ONE TABLET BY MOUTH TWICE DAILY @ 9AM & 5PM 12/16/22  Yes Tiff Patton MD   cetirizine (zyrTEC) 10 MG tablet TAKE 1 TABLET BY MOUTH EVERY DAY 5/30/23  Yes Kimberlee Cohen APRN   cholecalciferol (VITAMIN D3) 25 MCG (1000 UT) tablet TAKE ONE TABLET BY MOUTH DAILY AT 9 AM 12/16/22  Yes Tiff Patton MD   fluticasone (FLONASE) 50 MCG/ACT nasal spray inhale 2 sprays IN EACH NOSTRIL EVERY DAY 6/28/23  Yes Kimberlee Cohen APRN   hydroCHLOROthiazide (HYDRODIURIL) 25 MG tablet TAKE ONE TABLET BY MOUTH DAILY AT 9 AM 8/14/23  Yes Tiff Patton MD   hydrOXYzine (ATARAX) 25 MG tablet Take 1 tablet by mouth 3 (Three) Times a Day As Needed for Anxiety. 5/12/23  Yes Kimberlee Cohen APRN   losartan (COZAAR) 100 MG tablet TAKE ONE TABLET BY MOUTH DAILY AT 9 AM 8/14/23  Yes Tiff Patton MD   losartan-hydrochlorothiazide (HYZAAR) 100-25 MG per tablet Take 1 tablet by mouth Daily. 12/7/22  Yes Tiff Patton MD   metFORMIN (GLUCOPHAGE) 500 MG tablet TAKE ONE TABLET BY MOUTH DAILY AT 9 AM 8/16/23  Yes Tiff Patton MD   ofloxacin (FLOXIN) 0.3 % otic solution Administer 5 drops to the right ear Daily. 5/12/23  Yes Kimberlee Cohen APRN   potassium chloride (K-DUR,KLOR-CON) 10 MEQ CR tablet TAKE ONE TABLET BY MOUTH TWICE DAILY @ 9AM & 5PM 8/16/23  Yes Tiff Patton MD   potassium chloride (MICRO-K) 10 MEQ CR capsule TAKE ONE CAPSULE BY MOUTH TWICE DAILY @ 9AM & 5PM 12/16/22  Yes Tiff Patton MD   pravastatin (PRAVACHOL) 40 MG tablet Take 1 tablet by mouth Daily. 12/13/22  Yes  "Provider, MD Tiff   carbamide peroxide (Debrox) 6.5 % otic solution Administer 5 drops into both ears 2 (Two) Times a Day.  Patient not taking: Reported on 8/23/2023 2/6/23   Kimberlee Cohen APRN        Allergies:   Patient has no known allergies.    Health Maintenance Due   Topic Date Due    MAMMOGRAM  Never done    DXA SCAN  Never done    COLORECTAL CANCER SCREENING  Never done    HEPATITIS C SCREENING  Never done    DIABETIC FOOT EXAM  Never done    DIABETIC EYE EXAM  Never done    HEMOGLOBIN A1C  07/18/2023         Vital Signs:   Pulse 60   Temp 97.1 øF (36.2 øC)   Ht 160 cm (63\")   Wt 114 kg (252 lb)   SpO2 99%   BMI 44.64 kg/mý     Wt Readings from Last 3 Encounters:   08/23/23 114 kg (252 lb)   05/12/23 116 kg (255 lb 14.4 oz)   02/06/23 115 kg (254 lb 6.4 oz)     BP Readings from Last 3 Encounters:   05/12/23 122/82   02/06/23 136/88   12/27/22 142/88     Physical Exam  Vitals reviewed.   Constitutional:       Appearance: Normal appearance. She is well-developed.   HENT:      Head: Normocephalic and atraumatic.      Right Ear: There is impacted cerumen.      Left Ear: There is impacted cerumen.   Eyes:      Conjunctiva/sclera: Conjunctivae normal.      Pupils: Pupils are equal, round, and reactive to light.   Cardiovascular:      Rate and Rhythm: Normal rate and regular rhythm.      Heart sounds: No murmur heard.    No friction rub. No gallop.   Pulmonary:      Effort: Pulmonary effort is normal.      Breath sounds: Examination of the right-upper field reveals wheezing. Examination of the left-upper field reveals wheezing. Examination of the right-lower field reveals rales. Examination of the left-lower field reveals rales. Wheezing present. No rhonchi.   Abdominal:      General: Bowel sounds are normal. There is no distension.      Palpations: Abdomen is soft.      Tenderness: There is no abdominal tenderness.   Skin:     General: Skin is warm and dry.   Neurological:      Mental Status: " She is alert and oriented to person, place, and time.      Cranial Nerves: No cranial nerve deficit.   Psychiatric:         Mood and Affect: Mood and affect normal.         Behavior: Behavior normal.         Thought Content: Thought content normal.         Judgment: Judgment normal.         Result Review :    The following data was reviewed by HUA Senior on 08/23/23 at 15:41 EDT:    Common labs          1/18/2023    14:47 5/12/2023    16:06   Common Labs   Glucose 115     BUN 11     Creatinine 0.74     Sodium 138     Potassium 4.0     Chloride 102     Calcium 9.9     Albumin 4.4     Total Bilirubin 0.4     Alkaline Phosphatase 87     AST (SGOT) 11     ALT (SGPT) 6     WBC 5.39     Hemoglobin 12.9     Hematocrit 38.7     Platelets 320     Total Cholesterol 158     Triglycerides 61     HDL Cholesterol 55     LDL Cholesterol  91     Hemoglobin A1C 6.10     Microalbumin, Urine  <1.2        No Images in the past 120 days found..                 Assessment and Plan    Diagnoses and all orders for this visit:    1. Primary hypertension (Primary)  -     CBC & Differential  -     Comprehensive metabolic panel    2. Hyperlipidemia, unspecified hyperlipidemia type  -     Lipid Panel    3. Type 2 diabetes mellitus with hyperglycemia, without long-term current use of insulin  -     Hemoglobin A1c  -     CBC & Differential  -     Comprehensive metabolic panel    4. Generalized anxiety disorder    5. Bronchitis  -     azithromycin (Zithromax Z-Nitish) 250 MG tablet; Take 2 tablets by mouth on day 1, then 1 tablet daily on days 2-5  Dispense: 6 tablet; Refill: 0  -     predniSONE (DELTASONE) 20 MG tablet; Take 2 tablets by mouth Daily for 5 days.  Dispense: 10 tablet; Refill: 0    6. Wheezing  -     azithromycin (Zithromax Z-Nitish) 250 MG tablet; Take 2 tablets by mouth on day 1, then 1 tablet daily on days 2-5  Dispense: 6 tablet; Refill: 0  -     predniSONE (DELTASONE) 20 MG tablet; Take 2 tablets by mouth Daily for 5  days.  Dispense: 10 tablet; Refill: 0    7. Nasal congestion    8. Bilateral impacted cerumen  -     carbamide peroxide (Debrox) 6.5 % otic solution; Administer 5 drops into both ears 2 (Two) Times a Day.  Dispense: 15 mL; Refill: 1    9. Breast cancer screening by mammogram  -     Mammo Screening Digital Tomosynthesis Bilateral With CAD; Future       General medical examination:  - Orders placed for laboratory tests for the patient to complete when fasting. Order placed for mammogram for the patient to schedule. Advised the patient to complete her Cologuard home screening kit that she has at home.     Bronchitis:  - I will send in a prescription for azithromycin 250 mg to take as directed. I will also send in a prescription for prednisone 20 mg 2 tablets daily for 5 days.     Bilateral ear impacted cerumen:  - I will send in a refill prescription of Debrox ear drops for patient to administer 5 drops into each ear two times daily.         Smoking Cessation:    Claudia Underwood  reports that she has never smoked. She has never been exposed to tobacco smoke. She has never used smokeless tobacco.            Follow Up   Return in about 3 months (around 11/23/2023) for Next scheduled follow up.  Patient was given instructions and counseling regarding her condition or for health maintenance advice. Please see specific information pulled into the AVS if appropriate.     Please note that portions of this note were completed with a voice recognition program.    Transcribed from ambient dictation for HUA Senior by Selma Gonzalez.  08/23/23   16:44 EDT    Patient or patient representative verbalized consent to the visit recording.  I have personally performed the services described in this document as transcribed by the above individual, and it is both accurate and complete.

## 2023-08-24 ENCOUNTER — TELEPHONE (OUTPATIENT)
Dept: FAMILY MEDICINE CLINIC | Facility: CLINIC | Age: 73
End: 2023-08-24

## 2023-08-24 NOTE — TELEPHONE ENCOUNTER
Caller: John Underwooda    Relationship: Self    Best call back number:  [phone]    What medication are you requesting: SOMETHING FOR YEAST INFECTION     What are your current symptoms: JUST NOT FEELING RIGHT     How long have you been experiencing symptoms: SINCE YESTERDAY     Have you had these symptoms before:    [x] Yes  [] No    Have you been treated for these symptoms before:   [x] Yes  [] No    If a prescription is needed, what is your preferred pharmacy and phone number: [facility] [place] [address] [address] - [phone] [facility] [phone] FX     Additional notes: PATIENT BELIEVES SHE HAS A YEAST INFECTION DUE TO TAKEN ANTIBIOTICS PLEASE ADVISE IF SOMETHING IS GOING TO BE CALL IN

## 2023-08-25 RX ORDER — FLUCONAZOLE 150 MG/1
150 TABLET ORAL ONCE
Qty: 1 TABLET | Refills: 0 | Status: SHIPPED | OUTPATIENT
Start: 2023-08-25 | End: 2023-08-25

## 2023-09-08 ENCOUNTER — TELEPHONE (OUTPATIENT)
Dept: FAMILY MEDICINE CLINIC | Facility: CLINIC | Age: 73
End: 2023-09-08

## 2023-09-08 DIAGNOSIS — R06.2 WHEEZING: Primary | ICD-10-CM

## 2023-09-08 RX ORDER — ALBUTEROL SULFATE 90 UG/1
2 AEROSOL, METERED RESPIRATORY (INHALATION) EVERY 4 HOURS PRN
Qty: 18 G | Refills: 1 | Status: SHIPPED | OUTPATIENT
Start: 2023-09-08

## 2023-09-08 NOTE — TELEPHONE ENCOUNTER
Pt stated she is having wheezing and no cough. I spoke with you and you stated you will send over an albuterol inhaler for you. Patient is agreeable to that.

## 2023-09-08 NOTE — TELEPHONE ENCOUNTER
Caller: Claudia Underwood    Relationship: Self    Best call back number: [phone]     Requested Prescriptions:   Requested Prescriptions      No prescriptions requested or ordered in this encounter    azithromycin (Zithromax Z-Nitish) 250 MG tablet     Pharmacy where request should be sent: [facility] [place] [address] [facility] [address] - [phone]  - [phone] FX     Last office visit with prescribing clinician: 8/23/2023   Last telemedicine visit with prescribing clinician: Visit date not found   Next office visit with prescribing clinician: 11/29/2023     Additional details provided by patient: PATIENT STATES SHE IS FEELING A WHEEZING AND SHE HAS SINUS DRAINAGE.    PATIENT IS GOING OUT OF TOWN TODAY 9.8.23 AND WAS ASKING IF SHE COULD GET THE ZPAK THAT SHE RECEIVED ON 8.23.23 AND ANY OTHER MEDICATION THAT WAS GIVEN THAT DAY FOR HER COLD CALLED IN TODAY.     PATIENT WILL COME IN FOR A VISIT NEXT WEEK WHEN SHE RETURNS IF THAT IS NEEDED.    Does the patient have less than a 3 day supply:  [] Yes  [] No    Would you like a call back once the refill request has been completed: [] Yes [] No    If the office needs to give you a call back, can they leave a voicemail: [] Yes [] No    Shannon Walker, PCT   09/08/23 10:37 EDT

## 2023-09-16 DIAGNOSIS — J30.9 ALLERGIC RHINITIS, UNSPECIFIED SEASONALITY, UNSPECIFIED TRIGGER: ICD-10-CM

## 2023-09-18 RX ORDER — FLUTICASONE PROPIONATE 50 MCG
SPRAY, SUSPENSION (ML) NASAL
Qty: 16 G | Refills: 1 | Status: SHIPPED | OUTPATIENT
Start: 2023-09-18

## 2023-11-29 ENCOUNTER — OFFICE VISIT (OUTPATIENT)
Dept: FAMILY MEDICINE CLINIC | Facility: CLINIC | Age: 73
End: 2023-11-29
Payer: MEDICARE

## 2023-11-29 VITALS
HEIGHT: 63 IN | BODY MASS INDEX: 45.34 KG/M2 | HEART RATE: 57 BPM | OXYGEN SATURATION: 98 % | SYSTOLIC BLOOD PRESSURE: 136 MMHG | TEMPERATURE: 98.1 F | DIASTOLIC BLOOD PRESSURE: 76 MMHG | WEIGHT: 255.9 LBS

## 2023-11-29 DIAGNOSIS — R05.1 ACUTE COUGH: ICD-10-CM

## 2023-11-29 DIAGNOSIS — E78.5 HYPERLIPIDEMIA, UNSPECIFIED HYPERLIPIDEMIA TYPE: ICD-10-CM

## 2023-11-29 DIAGNOSIS — I10 PRIMARY HYPERTENSION: Primary | ICD-10-CM

## 2023-11-29 DIAGNOSIS — Z23 NEED FOR INFLUENZA VACCINATION: ICD-10-CM

## 2023-11-29 DIAGNOSIS — R09.81 NASAL CONGESTION: ICD-10-CM

## 2023-11-29 DIAGNOSIS — E55.9 VITAMIN D DEFICIENCY: ICD-10-CM

## 2023-11-29 DIAGNOSIS — J30.9 ALLERGIC RHINITIS, UNSPECIFIED SEASONALITY, UNSPECIFIED TRIGGER: ICD-10-CM

## 2023-11-29 PROCEDURE — 90662 IIV NO PRSV INCREASED AG IM: CPT

## 2023-11-29 PROCEDURE — 1159F MED LIST DOCD IN RCRD: CPT

## 2023-11-29 PROCEDURE — 3044F HG A1C LEVEL LT 7.0%: CPT

## 2023-11-29 PROCEDURE — 1160F RVW MEDS BY RX/DR IN RCRD: CPT

## 2023-11-29 PROCEDURE — G0008 ADMIN INFLUENZA VIRUS VAC: HCPCS

## 2023-11-29 PROCEDURE — 3078F DIAST BP <80 MM HG: CPT

## 2023-11-29 PROCEDURE — 99214 OFFICE O/P EST MOD 30 MIN: CPT

## 2023-11-29 PROCEDURE — 3075F SYST BP GE 130 - 139MM HG: CPT

## 2023-11-29 RX ORDER — CETIRIZINE HYDROCHLORIDE 10 MG/1
10 TABLET ORAL DAILY
Qty: 30 TABLET | Refills: 2 | Status: SHIPPED | OUTPATIENT
Start: 2023-11-29

## 2023-11-29 RX ORDER — BROMPHENIRAMINE MALEATE, PSEUDOEPHEDRINE HYDROCHLORIDE, AND DEXTROMETHORPHAN HYDROBROMIDE 2; 30; 10 MG/5ML; MG/5ML; MG/5ML
10 SYRUP ORAL 4 TIMES DAILY PRN
Qty: 473 ML | Refills: 0 | Status: SHIPPED | OUTPATIENT
Start: 2023-11-29

## 2023-11-29 NOTE — PROGRESS NOTES
Chief Complaint  Chief Complaint   Patient presents with    Hypertension    Follow-up       Subjective      Claudia Underwood presents to [facility]    The patient is a 73-year-old female who comes in today for routine follow-up on hypertension, hyperlipidemia, and vitamin D deficiency.    She has persistent allergies and is mildly congested. She has been around people who smoke cigarettes which tends to make it worse. She denies any fevers. She has been coughing up clear phlegm. She is not taking Zyrtec or Claritin, but she needs something to help with her congestion. She lives in an apartment where there were previously dogs and cats living. Her eyes are watery. She was given a Z-Nitish and prednisone recently for the symptoms with no improvement.    She notes her blood pressure might be elevated because she is upset. She has been upset since Thanksgiving. She sees Dr. Keller for her blood pressure and cholesterol. He has not changed any of her medications. She saw Dr. Keller last week and is going to get some blood work done. Her blood pressure and cholesterol are rather good. She had a heart catheterization, and they did not see anything. She has EKGs every 6 months and they have been normal.    Objective     Medical History:  Past Medical History:   Diagnosis Date    Allergic     Arthritis     Cataract     Diabetes mellitus     Hypertension      History reviewed. No pertinent surgical history.   Social History     Tobacco Use    Smoking status: Never     Passive exposure: Never    Smokeless tobacco: Never   Vaping Use    Vaping Use: Never used   Substance Use Topics    Alcohol use: Never    Drug use: Never     History reviewed. No pertinent family history.    Medications:  Prior to Admission medications    Medication Sig Start Date End Date Taking? Authorizing Provider   albuterol sulfate  (90 Base) MCG/ACT inhaler Inhale 2 puffs Every 4 (Four) Hours As Needed for Wheezing. 9/8/23   Yes Kimberlee Cohen APRN   amLODIPine (NORVASC) 10 MG tablet Take 1 tablet by mouth Daily. 12/8/22  Yes ProviderTiff MD   Aspirin Low Dose 81 MG EC tablet Take 1 tablet by mouth Daily. 12/8/22  Yes Provider, MD Tiff   carbamide peroxide (Debrox) 6.5 % otic solution Administer 5 drops into both ears 2 (Two) Times a Day. 8/23/23  Yes Kimberlee Cohen APRN   carvedilol (COREG) 25 MG tablet TAKE ONE TABLET BY MOUTH TWICE DAILY @ 9AM & 5PM 12/16/22  Yes Provider, MD Tiff   cetirizine (zyrTEC) 10 MG tablet TAKE 1 TABLET BY MOUTH EVERY DAY 5/30/23  Yes Kimberlee Cohen APRN   cholecalciferol (VITAMIN D3) 25 MCG (1000 UT) tablet TAKE ONE TABLET BY MOUTH DAILY AT 9 AM 12/16/22  Yes Provider, MD Tiff   fluticasone (FLONASE) 50 MCG/ACT nasal spray inhale 2 sprays IN EACH NOSTRIL EVERY DAY 9/18/23  Yes Kimberlee Cohen APRN   hydroCHLOROthiazide (HYDRODIURIL) 25 MG tablet TAKE ONE TABLET BY MOUTH DAILY AT 9 AM 8/14/23  Yes Provider, MD Tiff   losartan (COZAAR) 100 MG tablet TAKE ONE TABLET BY MOUTH DAILY AT 9 AM 8/14/23  Yes Provider, MD Tiff   losartan-hydrochlorothiazide (HYZAAR) 100-25 MG per tablet Take 1 tablet by mouth Daily. 12/7/22  Yes ProviderTiff MD   ofloxacin (FLOXIN) 0.3 % otic solution Administer 5 drops to the right ear Daily. 5/12/23  Yes Kimberlee Cohen APRN   potassium chloride (K-DUR,KLOR-CON) 10 MEQ CR tablet TAKE ONE TABLET BY MOUTH TWICE DAILY @ 9AM & 5PM 8/16/23  Yes ProviderTiff MD   pravastatin (PRAVACHOL) 40 MG tablet Take 1 tablet by mouth Daily. 12/13/22  Yes ProviderTiff MD   azithromycin (Zithromax Z-Nitish) 250 MG tablet Take 2 tablets by mouth on day 1, then 1 tablet daily on days 2-5  Patient not taking: Reported on 11/29/2023 8/23/23   Kimberlee Cohen APRN   hydrOXYzine (ATARAX) 25 MG tablet Take 1 tablet by mouth 3 (Three) Times a Day As Needed for Anxiety.  Patient not taking:  "Reported on 11/29/2023 5/12/23   Noel Kimberlee Yoko APRSUGEY   metFORMIN (GLUCOPHAGE) 500 MG tablet TAKE ONE TABLET BY MOUTH DAILY AT 9 AM  Patient not taking: Reported on 11/29/2023 8/16/23   Provider, MD Tiff   potassium chloride (MICRO-K) 10 MEQ CR capsule TAKE ONE CAPSULE BY MOUTH TWICE DAILY @ 9AM & 5PM 12/16/22   Provider, MD Tiff        Allergies:   Patient has no known allergies.    Health Maintenance Due   Topic Date Due    MAMMOGRAM  Never done    DXA SCAN  Never done    COLORECTAL CANCER SCREENING  Never done    HEPATITIS C SCREENING  Never done    DIABETIC FOOT EXAM  Never done    DIABETIC EYE EXAM  Never done    HEMOGLOBIN A1C  07/18/2023    COVID-19 Vaccine (5 - 2023-24 season) 09/01/2023         Vital Signs:   /76   Pulse 57   Temp 98.1 °F (36.7 °C)   Ht 160 cm (63\")   Wt 116 kg (255 lb 14.4 oz)   SpO2 98%   BMI 45.33 kg/m²     Wt Readings from Last 3 Encounters:   11/29/23 116 kg (255 lb 14.4 oz)   08/23/23 114 kg (252 lb)   05/12/23 116 kg (255 lb 14.4 oz)     BP Readings from Last 3 Encounters:   11/29/23 136/76   05/12/23 122/82   02/06/23 136/88     Physical Exam  Vitals reviewed.   Constitutional:       Appearance: Normal appearance. She is well-developed.   HENT:      Head: Normocephalic and atraumatic.      Nose: Congestion and rhinorrhea present. Rhinorrhea is clear.      Right Sinus: No maxillary sinus tenderness or frontal sinus tenderness.      Left Sinus: No maxillary sinus tenderness or frontal sinus tenderness.   Eyes:      Conjunctiva/sclera: Conjunctivae normal.      Pupils: Pupils are equal, round, and reactive to light.   Cardiovascular:      Rate and Rhythm: Normal rate and regular rhythm.      Heart sounds: No murmur heard.     No friction rub. No gallop.   Pulmonary:      Effort: Pulmonary effort is normal.      Breath sounds: Normal breath sounds. No wheezing or rhonchi.   Abdominal:      General: Bowel sounds are normal. There is no distension.      " Palpations: Abdomen is soft.      Tenderness: There is no abdominal tenderness.   Skin:     General: Skin is warm and dry.   Neurological:      Mental Status: She is alert and oriented to person, place, and time.      Cranial Nerves: No cranial nerve deficit.   Psychiatric:         Mood and Affect: Mood and affect normal.         Behavior: Behavior normal.         Thought Content: Thought content normal.         Judgment: Judgment normal.         Result Review :    The following data was reviewed by HUA Senior on 11/30/23 at 08:23 EST:    Common labs          1/18/2023    14:47 5/12/2023    16:06   Common Labs   Glucose 115     BUN 11     Creatinine 0.74     Sodium 138     Potassium 4.0     Chloride 102     Calcium 9.9     Albumin 4.4     Total Bilirubin 0.4     Alkaline Phosphatase 87     AST (SGOT) 11     ALT (SGPT) 6     WBC 5.39     Hemoglobin 12.9     Hematocrit 38.7     Platelets 320     Total Cholesterol 158     Triglycerides 61     HDL Cholesterol 55     LDL Cholesterol  91     Hemoglobin A1C 6.10     Microalbumin, Urine  <1.2        No Images in the past 120 days found..             Assessment and Plan    Diagnoses and all orders for this visit:    1. Primary hypertension (Primary)    2. Hyperlipidemia, unspecified hyperlipidemia type    3. Vitamin D deficiency  -     Vitamin D 25 hydroxy    4. Allergic rhinitis, unspecified seasonality, unspecified trigger  -     cetirizine (zyrTEC) 10 MG tablet; Take 1 tablet by mouth Daily.  Dispense: 30 tablet; Refill: 2    5. Nasal congestion  -     cetirizine (zyrTEC) 10 MG tablet; Take 1 tablet by mouth Daily.  Dispense: 30 tablet; Refill: 2    6. Acute cough  -     brompheniramine-pseudoephedrine-DM 30-2-10 MG/5ML syrup; Take 10 mL by mouth 4 (Four) Times a Day As Needed for Congestion or Cough.  Dispense: 473 mL; Refill: 0    7. Need for influenza vaccination  -     Fluzone High-Dose 65+yrs (0360-9882)    1. Hypertension.  Her medication is  prescribed by Dr. Keller whom she follows up with.  She is fine recently with no changes to medication.    2. Congestion.  I will prescribe her Bromfed cough syrup which she can take 2 to 3 times a day. I will also prescribe her Zyrtec which she can take every day.    Follow-up  The patient will follow up in 6 months or sooner if needed. She has labs ordered and plans to have those drawn in the next few days.       Smoking Cessation:    Claudia Underwood  reports that she has never smoked. She has never been exposed to tobacco smoke. She has never used smokeless tobacco.      Follow Up   Return in about 6 months (around 5/29/2024) for Next scheduled follow up.  Patient was given instructions and counseling regarding her condition or for health maintenance advice. Please see specific information pulled into the AVS if appropriate.     Please note that portions of this note were completed with a voice recognition program.      Transcribed from ambient dictation for HUA Senior by Mary Ross  11/29/23   19:13 EST    Patient or patient representative verbalized consent to the visit recording.  I have personally performed the services described in this document as transcribed by the above individual, and it is both accurate and complete.

## 2023-12-08 DIAGNOSIS — R06.2 WHEEZING: ICD-10-CM

## 2023-12-08 RX ORDER — ALBUTEROL SULFATE 90 UG/1
2 AEROSOL, METERED RESPIRATORY (INHALATION) EVERY 4 HOURS PRN
Qty: 8.5 G | Refills: 1 | Status: SHIPPED | OUTPATIENT
Start: 2023-12-08

## 2024-01-30 ENCOUNTER — TRANSCRIBE ORDERS (OUTPATIENT)
Dept: LAB | Facility: HOSPITAL | Age: 74
End: 2024-01-30
Payer: MEDICARE

## 2024-01-30 ENCOUNTER — LAB (OUTPATIENT)
Dept: LAB | Facility: HOSPITAL | Age: 74
End: 2024-01-30
Payer: MEDICARE

## 2024-01-30 DIAGNOSIS — E03.9 HYPOTHYROIDISM, UNSPECIFIED TYPE: ICD-10-CM

## 2024-01-30 DIAGNOSIS — I10 HYPERTENSION, ESSENTIAL: ICD-10-CM

## 2024-01-30 DIAGNOSIS — E11.9 DIABETES MELLITUS WITHOUT COMPLICATION: ICD-10-CM

## 2024-01-30 DIAGNOSIS — E78.5 HYPERLIPIDEMIA, UNSPECIFIED HYPERLIPIDEMIA TYPE: Primary | ICD-10-CM

## 2024-01-30 DIAGNOSIS — E78.5 HYPERLIPIDEMIA, UNSPECIFIED HYPERLIPIDEMIA TYPE: ICD-10-CM

## 2024-01-30 LAB
25(OH)D3 SERPL-MCNC: 38.2 NG/ML (ref 30–100)
ALBUMIN SERPL-MCNC: 4.2 G/DL (ref 3.5–5.2)
ALBUMIN/GLOB SERPL: 1.4 G/DL
ALP SERPL-CCNC: 82 U/L (ref 39–117)
ALT SERPL W P-5'-P-CCNC: 6 U/L (ref 1–33)
ANION GAP SERPL CALCULATED.3IONS-SCNC: 11.4 MMOL/L (ref 5–15)
AST SERPL-CCNC: 14 U/L (ref 1–32)
BASOPHILS # BLD AUTO: 0.03 10*3/MM3 (ref 0–0.2)
BASOPHILS NFR BLD AUTO: 0.6 % (ref 0–1.5)
BILIRUB SERPL-MCNC: 0.4 MG/DL (ref 0–1.2)
BUN SERPL-MCNC: 15 MG/DL (ref 8–23)
BUN/CREAT SERPL: 16.7 (ref 7–25)
CALCIUM SPEC-SCNC: 10.1 MG/DL (ref 8.6–10.5)
CHLORIDE SERPL-SCNC: 103 MMOL/L (ref 98–107)
CHOLEST SERPL-MCNC: 168 MG/DL (ref 0–200)
CO2 SERPL-SCNC: 27.6 MMOL/L (ref 22–29)
CREAT SERPL-MCNC: 0.9 MG/DL (ref 0.57–1)
DEPRECATED RDW RBC AUTO: 40.4 FL (ref 37–54)
EGFRCR SERPLBLD CKD-EPI 2021: 68.5 ML/MIN/1.73
EOSINOPHIL # BLD AUTO: 0.35 10*3/MM3 (ref 0–0.4)
EOSINOPHIL NFR BLD AUTO: 6.8 % (ref 0.3–6.2)
ERYTHROCYTE [DISTWIDTH] IN BLOOD BY AUTOMATED COUNT: 12.2 % (ref 12.3–15.4)
GLOBULIN UR ELPH-MCNC: 2.9 GM/DL
GLUCOSE SERPL-MCNC: 100 MG/DL (ref 65–99)
HBA1C MFR BLD: 6.3 % (ref 4.8–5.6)
HCT VFR BLD AUTO: 39.2 % (ref 34–46.6)
HDLC SERPL-MCNC: 56 MG/DL (ref 40–60)
HGB BLD-MCNC: 13.3 G/DL (ref 12–15.9)
IMM GRANULOCYTES # BLD AUTO: 0.01 10*3/MM3 (ref 0–0.05)
IMM GRANULOCYTES NFR BLD AUTO: 0.2 % (ref 0–0.5)
LDLC SERPL CALC-MCNC: 102 MG/DL (ref 0–100)
LDLC/HDLC SERPL: 1.83 {RATIO}
LYMPHOCYTES # BLD AUTO: 2.07 10*3/MM3 (ref 0.7–3.1)
LYMPHOCYTES NFR BLD AUTO: 40.2 % (ref 19.6–45.3)
MCH RBC QN AUTO: 30.6 PG (ref 26.6–33)
MCHC RBC AUTO-ENTMCNC: 33.9 G/DL (ref 31.5–35.7)
MCV RBC AUTO: 90.3 FL (ref 79–97)
MONOCYTES # BLD AUTO: 0.62 10*3/MM3 (ref 0.1–0.9)
MONOCYTES NFR BLD AUTO: 12 % (ref 5–12)
NEUTROPHILS NFR BLD AUTO: 2.07 10*3/MM3 (ref 1.7–7)
NEUTROPHILS NFR BLD AUTO: 40.2 % (ref 42.7–76)
NRBC BLD AUTO-RTO: 0 /100 WBC (ref 0–0.2)
PLATELET # BLD AUTO: 296 10*3/MM3 (ref 140–450)
PMV BLD AUTO: 10.3 FL (ref 6–12)
POTASSIUM SERPL-SCNC: 4.2 MMOL/L (ref 3.5–5.2)
PROT SERPL-MCNC: 7.1 G/DL (ref 6–8.5)
RBC # BLD AUTO: 4.34 10*6/MM3 (ref 3.77–5.28)
SODIUM SERPL-SCNC: 142 MMOL/L (ref 136–145)
TRIGL SERPL-MCNC: 48 MG/DL (ref 0–150)
VLDLC SERPL-MCNC: 10 MG/DL (ref 5–40)
WBC NRBC COR # BLD AUTO: 5.15 10*3/MM3 (ref 3.4–10.8)

## 2024-01-30 PROCEDURE — 80061 LIPID PANEL: CPT

## 2024-01-30 PROCEDURE — 82306 VITAMIN D 25 HYDROXY: CPT

## 2024-01-30 PROCEDURE — 85025 COMPLETE CBC W/AUTO DIFF WBC: CPT

## 2024-01-30 PROCEDURE — 80053 COMPREHEN METABOLIC PANEL: CPT

## 2024-01-30 PROCEDURE — 83036 HEMOGLOBIN GLYCOSYLATED A1C: CPT

## 2024-06-06 ENCOUNTER — OFFICE VISIT (OUTPATIENT)
Dept: FAMILY MEDICINE CLINIC | Facility: CLINIC | Age: 74
End: 2024-06-06
Payer: MEDICARE

## 2024-06-06 VITALS
HEIGHT: 63 IN | OXYGEN SATURATION: 94 % | TEMPERATURE: 98.1 F | SYSTOLIC BLOOD PRESSURE: 138 MMHG | WEIGHT: 247.6 LBS | HEART RATE: 64 BPM | DIASTOLIC BLOOD PRESSURE: 88 MMHG | BODY MASS INDEX: 43.87 KG/M2

## 2024-06-06 DIAGNOSIS — F41.1 GENERALIZED ANXIETY DISORDER: ICD-10-CM

## 2024-06-06 DIAGNOSIS — F41.0 PANIC ATTACKS: ICD-10-CM

## 2024-06-06 DIAGNOSIS — I10 PRIMARY HYPERTENSION: ICD-10-CM

## 2024-06-06 DIAGNOSIS — Z00.00 MEDICARE ANNUAL WELLNESS VISIT, SUBSEQUENT: Primary | ICD-10-CM

## 2024-06-06 DIAGNOSIS — F43.9 STRESS: ICD-10-CM

## 2024-06-06 DIAGNOSIS — R73.03 PREDIABETES: ICD-10-CM

## 2024-06-06 DIAGNOSIS — E78.5 HYPERLIPIDEMIA, UNSPECIFIED HYPERLIPIDEMIA TYPE: ICD-10-CM

## 2024-06-06 PROCEDURE — 3044F HG A1C LEVEL LT 7.0%: CPT

## 2024-06-06 PROCEDURE — 1159F MED LIST DOCD IN RCRD: CPT

## 2024-06-06 PROCEDURE — 1160F RVW MEDS BY RX/DR IN RCRD: CPT

## 2024-06-06 PROCEDURE — 1170F FXNL STATUS ASSESSED: CPT

## 2024-06-06 PROCEDURE — 99214 OFFICE O/P EST MOD 30 MIN: CPT

## 2024-06-06 PROCEDURE — 1126F AMNT PAIN NOTED NONE PRSNT: CPT

## 2024-06-06 PROCEDURE — 3079F DIAST BP 80-89 MM HG: CPT

## 2024-06-06 PROCEDURE — 3075F SYST BP GE 130 - 139MM HG: CPT

## 2024-06-06 PROCEDURE — G0439 PPPS, SUBSEQ VISIT: HCPCS

## 2024-06-06 RX ORDER — LORAZEPAM 0.5 MG/1
0.5 TABLET ORAL EVERY 8 HOURS PRN
Qty: 10 TABLET | Refills: 0 | Status: SHIPPED | OUTPATIENT
Start: 2024-06-06

## 2024-06-06 RX ORDER — HYDROXYZINE HYDROCHLORIDE 25 MG/1
25 TABLET, FILM COATED ORAL 3 TIMES DAILY PRN
Qty: 60 TABLET | Refills: 1 | Status: SHIPPED | OUTPATIENT
Start: 2024-06-06

## 2024-06-06 NOTE — PROGRESS NOTES
The ABCs of the Annual Wellness Visit  Subsequent Medicare Wellness Visit    Subjective    Claudia Underwood is a 74 y.o. female who presents for a Subsequent Medicare Wellness Visit.    The following portions of the patient's history were reviewed and   updated as appropriate: allergies, current medications, past family history, past medical history, past social history, past surgical history, and problem list.    Compared to one year ago, the patient feels her physical   health is the same.    Compared to one year ago, the patient feels her mental   health is worse.    Recent Hospitalizations:  She was not admitted to the hospital during the last year.       Current Medical Providers:  Patient Care Team:  Kimberlee Cohen APRN as PCP - General (Family Medicine)    Outpatient Medications Prior to Visit   Medication Sig Dispense Refill    albuterol sulfate  (90 Base) MCG/ACT inhaler INHALE 2 PUFFS EVERY 4 HOURS AS NEEDED FOR WHEEZING 8.5 g 1    amLODIPine (NORVASC) 10 MG tablet Take 1 tablet by mouth Daily.      Aspirin Low Dose 81 MG EC tablet Take 1 tablet by mouth Daily.      carbamide peroxide (Debrox) 6.5 % otic solution Administer 5 drops into both ears 2 (Two) Times a Day. 15 mL 1    carvedilol (COREG) 25 MG tablet TAKE ONE TABLET BY MOUTH TWICE DAILY @ 9AM & 5PM      cetirizine (zyrTEC) 10 MG tablet Take 1 tablet by mouth Daily. 30 tablet 2    cholecalciferol (VITAMIN D3) 25 MCG (1000 UT) tablet TAKE ONE TABLET BY MOUTH DAILY AT 9 AM      fluticasone (FLONASE) 50 MCG/ACT nasal spray inhale 2 sprays IN EACH NOSTRIL EVERY DAY 16 g 1    hydroCHLOROthiazide (HYDRODIURIL) 25 MG tablet TAKE ONE TABLET BY MOUTH DAILY AT 9 AM      losartan (COZAAR) 100 MG tablet TAKE ONE TABLET BY MOUTH DAILY AT 9 AM      ofloxacin (FLOXIN) 0.3 % otic solution Administer 5 drops to the right ear Daily. 10 mL 0    potassium chloride (K-DUR,KLOR-CON) 10 MEQ CR tablet TAKE ONE TABLET BY MOUTH TWICE DAILY @ 9AM & 5PM       pravastatin (PRAVACHOL) 40 MG tablet Take 1 tablet by mouth Daily.      brompheniramine-pseudoephedrine-DM 30-2-10 MG/5ML syrup Take 10 mL by mouth 4 (Four) Times a Day As Needed for Congestion or Cough. (Patient not taking: Reported on 6/6/2024) 473 mL 0    hydrOXYzine (ATARAX) 25 MG tablet Take 1 tablet by mouth 3 (Three) Times a Day As Needed for Anxiety. (Patient not taking: Reported on 11/29/2023) 60 tablet 1    losartan-hydrochlorothiazide (HYZAAR) 100-25 MG per tablet Take 1 tablet by mouth Daily. (Patient not taking: Reported on 6/6/2024)      metFORMIN (GLUCOPHAGE) 500 MG tablet TAKE ONE TABLET BY MOUTH DAILY AT 9 AM (Patient not taking: Reported on 11/29/2023)       No facility-administered medications prior to visit.       No opioid medication identified on active medication list. I have reviewed chart for other potential  high risk medication/s and harmful drug interactions in the elderly.        Aspirin is on active medication list. Aspirin use is indicated based on review of current medical condition/s. Pros and cons of this therapy have been discussed today. Benefits of this medication outweigh potential harm.  Patient has been encouraged to continue taking this medication.  .      Patient Active Problem List   Diagnosis    Primary hypertension    Hyperlipidemia    Type 2 diabetes mellitus with hyperglycemia, without long-term current use of insulin    History of hypothyroidism    Morbid (severe) obesity due to excess calories    Allergic rhinitis    Metabolic syndrome    Hypothyroidism    Hypokalemia    Generalized anxiety disorder    Gastroesophageal reflux disease     Advance Care Planning   Advance Care Planning     Advance Directive is not on file.  ACP discussion was held with the patient during this visit. Patient does not have an advance directive, information provided.     Objective    Vitals:    06/06/24 1039   BP: 138/88   BP Location: Left arm   Patient Position: Sitting   Cuff Size: Adult  "  Pulse: 64   Temp: 98.1 °F (36.7 °C)   SpO2: 94%   Weight: 112 kg (247 lb 9.6 oz)   Height: 160 cm (63\")     Estimated body mass index is 43.86 kg/m² as calculated from the following:    Height as of this encounter: 160 cm (63\").    Weight as of this encounter: 112 kg (247 lb 9.6 oz).    Class 3 Severe Obesity (BMI >=40). Obesity-related health conditions include the following: hypertension and dyslipidemias. Obesity is unchanged. BMI is is above average; BMI management plan is completed. We discussed portion control and increasing exercise.      Does the patient have evidence of cognitive impairment? No          HEALTH RISK ASSESSMENT    Smoking Status:  Social History     Tobacco Use   Smoking Status Never    Passive exposure: Never   Smokeless Tobacco Never     Alcohol Consumption:  Social History     Substance and Sexual Activity   Alcohol Use Never     Fall Risk Screen:    SIDNEY Fall Risk Assessment was completed, and patient is at LOW risk for falls.Assessment completed on:2024    Depression Screenin/6/2024    10:38 AM   PHQ-2/PHQ-9 Depression Screening   Little Interest or Pleasure in Doing Things 0-->not at all   Feeling Down, Depressed or Hopeless 0-->not at all   PHQ-9: Brief Depression Severity Measure Score 0       Health Habits and Functional and Cognitive Screenin/6/2024    11:00 AM   Functional & Cognitive Status   Do you have difficulty preparing food and eating? No   Do you have difficulty bathing yourself, getting dressed or grooming yourself? No   Do you have difficulty using the toilet? No   Do you have difficulty moving around from place to place? No   Do you have trouble with steps or getting out of a bed or a chair? No   Current Diet Unhealthy Diet   Dental Exam Other   Eye Exam Up to date   Exercise (times per week) 0 times per week   Current Exercises Include No Regular Exercise   Do you need help using the phone?  No   Are you deaf or do you have serious difficulty " hearing?  No   Do you need help to go to places out of walking distance? No   Do you need help shopping? No   Do you need help preparing meals?  No   Do you need help with housework?  No   Do you need help with laundry? No   Do you need help taking your medications? No   Do you need help managing money? No   Do you ever drive or ride in a car without wearing a seat belt? No   Have you felt unusual stress, anger or loneliness in the last month? Yes   Who do you live with? Alone   If you need help, do you have trouble finding someone available to you? No   Have you been bothered in the last four weeks by sexual problems? No   Do you have difficulty concentrating, remembering or making decisions? No       Age-appropriate Screening Schedule:  Refer to the list below for future screening recommendations based on patient's age, sex and/or medical conditions. Orders for these recommended tests are listed in the plan section. The patient has been provided with a written plan.    Health Maintenance   Topic Date Due    MAMMOGRAM  Never done    DXA SCAN  Never done    COLORECTAL CANCER SCREENING  Never done    Pneumococcal Vaccine 65+ (1 of 2 - PCV) Never done    DIABETIC EYE EXAM  Never done    TDAP/TD VACCINES (1 - Tdap) Never done    ZOSTER VACCINE (1 of 2) Never done    RSV Vaccine - Adults (1 - 1-dose 60+ series) Never done    HEPATITIS C SCREENING  Never done    DIABETIC FOOT EXAM  Never done    URINE MICROALBUMIN  05/12/2024    HEMOGLOBIN A1C  07/30/2024    COVID-19 Vaccine (5 - 2023-24 season) 06/08/2024 (Originally 9/1/2023)    INFLUENZA VACCINE  08/01/2024    LIPID PANEL  01/30/2025    ANNUAL WELLNESS VISIT  06/06/2025    BMI FOLLOWUP  06/06/2025                  CMS Preventative Services Quick Reference  Risk Factors Identified During Encounter  Depression/Dysphoria: Prescribed new antidepressant medication treatment. Follow up visit planned.  Inactivity/Sedentary: Patient was advised to exercise at least 150  minutes a week per CDC recommendations.  Dental Screening Recommended  Vision Screening Recommended  The above risks/problems have been discussed with the patient.  Pertinent information has been shared with the patient in the After Visit Summary.  An After Visit Summary and PPPS were made available to the patient.    Follow Up:   Next Medicare Wellness visit to be scheduled in 1 year.       Additional E&M Note during same encounter follows:  Patient has multiple medical problems which are significant and separately identifiable that require additional work above and beyond the Medicare Wellness Visit.      Chief Complaint  Medicare Wellness-subsequent and Hypertension    Subjective        History of Present Illness  The patient presents for her annual Medicare wellness visit.    The patient reports experiencing significant stress, which she attributes to her daughter's recent vehicular accident. Despite being unaware of her current anxiolytic medication, she has been experiencing sleep disturbances. She describes a sensation of blockage in her nerves. Despite her ongoing stress, she does not engage in any therapeutic or social support. She reports persistent anxiety, which she believes contributes to her cardiac discomfort. She has not previously taken any mood-related medications. She recalls a previous prescription of Valium, which she found beneficial in calming her down. She denies experiencing any chest pain.    The patient has a scheduled appointment with Dr. Keller next week, during which no changes were made to her medication regimen. She acknowledges that she did not take her blood pressure medication this morning. Her current medication regimen includes amlodipine 10 mg, losartan, and carvedilol 25 mg.    The patient denies experiencing any difficulties with meal preparation, eating, bathing, grooming, toileting, mobility, stair climbing, or rising from a seated position. She acknowledges a suboptimal diet.  "Her dental and ophthalmological exams are current, with her last eye examination occurring approximately 2 weeks ago. Despite her busy schedule, she does not engage in regular exercise. She does not require assistance with phone use, hearing, walking distance, shopping, meals, housework, laundry, medications, or financial management. She does not drive without wearing a seatbelt. She admits to experiencing unusual stress, anger, and loneliness in the past month. She lives alone and does not require assistance in finding support. She denies experiencing sexual problems in the past 4 weeks. She denies difficulty concentrating, remembering, or making decisions. Dr. Keller has prescribed a low-dose aspirin. She has not required overnight hospital admissions in the past year. She does not have an advance directive.    Supplemental Information  She was taken off of metformin at [facility] because she did not need it. Metformin gave her diarrhea. She takes hydrochlorothiazide as needed. She only has swelling when she eats potato chips. She had a nurse to come to her house last year. She was borderline diabetic.      Objective   Vital Signs:  /88 (BP Location: Left arm, Patient Position: Sitting, Cuff Size: Adult)   Pulse 64   Temp 98.1 °F (36.7 °C)   Ht 160 cm (63\")   Wt 112 kg (247 lb 9.6 oz)   SpO2 94%   BMI 43.86 kg/m²     Physical Exam  Vitals reviewed.   Constitutional:       Appearance: Normal appearance. She is well-developed. She is morbidly obese.   HENT:      Head: Normocephalic and atraumatic.   Eyes:      Conjunctiva/sclera: Conjunctivae normal.      Pupils: Pupils are equal, round, and reactive to light.   Cardiovascular:      Rate and Rhythm: Normal rate and regular rhythm.      Heart sounds: Murmur heard.      No friction rub. No gallop.   Pulmonary:      Effort: Pulmonary effort is normal.      Breath sounds: Normal breath sounds. No wheezing or rhonchi.   Abdominal:      General: Bowel sounds are " normal. There is no distension.      Palpations: Abdomen is soft.      Tenderness: There is no abdominal tenderness.   Skin:     General: Skin is warm and dry.   Neurological:      Mental Status: She is alert and oriented to person, place, and time.      Cranial Nerves: No cranial nerve deficit.   Psychiatric:         Attention and Perception: Attention normal.         Mood and Affect: Mood is anxious. Affect is tearful.         Speech: Speech normal.         Behavior: Behavior normal. Behavior is cooperative.         Thought Content: Thought content normal.         Cognition and Memory: Cognition normal.         Judgment: Judgment normal.        Physical Exam  Heart rhythm is regular.        Common labs          1/30/2024    11:40   Common Labs   Glucose 100    BUN 15    Creatinine 0.90    Sodium 142    Potassium 4.2    Chloride 103    Calcium 10.1    Albumin 4.2    Total Bilirubin 0.4    Alkaline Phosphatase 82    AST (SGOT) 14    ALT (SGPT) 6    WBC 5.15    Hemoglobin 13.3    Hematocrit 39.2    Platelets 296    Total Cholesterol 168    Triglycerides 48    HDL Cholesterol 56    LDL Cholesterol  102    Hemoglobin A1C 6.30        Results               Assessment and Plan   Diagnoses and all orders for this visit:    1. Medicare annual wellness visit, subsequent (Primary)    2. Primary hypertension    3. Hyperlipidemia, unspecified hyperlipidemia type    4. Stress  -     LORazepam (Ativan) 0.5 MG tablet; Take 1 tablet by mouth Every 8 (Eight) Hours As Needed for Anxiety.  Dispense: 10 tablet; Refill: 0    5. Panic attacks  -     LORazepam (Ativan) 0.5 MG tablet; Take 1 tablet by mouth Every 8 (Eight) Hours As Needed for Anxiety.  Dispense: 10 tablet; Refill: 0    6. Generalized anxiety disorder  -     LORazepam (Ativan) 0.5 MG tablet; Take 1 tablet by mouth Every 8 (Eight) Hours As Needed for Anxiety.  Dispense: 10 tablet; Refill: 0  -     hydrOXYzine (ATARAX) 25 MG tablet; Take 1 tablet by mouth 3 (Three) Times a  Day As Needed for Anxiety.  Dispense: 60 tablet; Refill: 1    7. Prediabetes      Patient has been erroneously marked as diabetic. Based on the available clinical information, she does not have diabetes and should therefore be excluded from diabetic health maintenance and quality measures for the remainder of the reporting period.   Assessment & Plan  1. Anxiety.  The patient was counseled to seek support from [employer], family, and friends. A prescription for hydroxyzine was provided, with instructions to take it nightly if necessary. Additionally, Ativan was prescribed, to be used as needed for panic attacks. The patient was cautioned against driving after taking Ativan.  Follow-up in 1 month to discuss stress and anxiety.    2.  Hypertension/hyperlipidemia.  The patient's blood pressure has shown improvement since her last visit. Laboratory tests will be conducted during her next visit.  Patient has follow-up with her cardiologist, Dr. Keller, within the next couple weeks.    Follow-up  The patient is scheduled for a follow-up visit in 1 month if necessary, but not later than 3 months to monitor and discuss mood and anxiety.  Labs to be drawn at next visit.         Follow Up   Return in about 3 months (around 9/6/2024) for Next scheduled follow up.  Patient was given instructions and counseling regarding her condition or for health maintenance advice. Please see specific information pulled into the AVS if appropriate.       Patient or patient representative verbalized consent for the use of Ambient Listening during the visit with  HUA Senior for chart documentation. 6/6/2024  11:17 EDT

## 2024-07-23 ENCOUNTER — TELEPHONE (OUTPATIENT)
Dept: FAMILY MEDICINE CLINIC | Facility: CLINIC | Age: 74
End: 2024-07-23

## 2024-07-23 NOTE — TELEPHONE ENCOUNTER
Caller: Claudia Underwood    Relationship: Self    Best call back number: [phone]     What is the best time to reach you: ANYTIME     Who are you requesting to speak with (clinical staff, provider,  specific staff member): CLINICAL     What was the call regarding: PATIENT IS CALLING IN REGARDS TO A RECALL, VELASQUEZ SENT OUT LETTERS TO PATIENT'S TAKING  potassium chloride (K-DUR,KLOR-CON) 10 MEQ CR tablet

## 2024-07-24 NOTE — TELEPHONE ENCOUNTER
Spoke with patient and advised her to call her pharmacy to see if her medication lot number is on the recall list and if they can supply her with a new bottle. Patient verbalized understanding and wanted me to let provider know.

## 2024-09-24 ENCOUNTER — TRANSCRIBE ORDERS (OUTPATIENT)
Dept: ADMINISTRATIVE | Facility: HOSPITAL | Age: 74
End: 2024-09-24
Payer: MEDICARE

## 2024-09-24 DIAGNOSIS — R09.89 OTHER SPECIFIED SYMPTOMS AND SIGNS INVOLVING THE CIRCULATORY AND RESPIRATORY SYSTEMS: Primary | ICD-10-CM

## 2024-11-01 ENCOUNTER — HOSPITAL ENCOUNTER (OUTPATIENT)
Dept: CARDIOLOGY | Facility: HOSPITAL | Age: 74
Discharge: HOME | End: 2024-11-01
Payer: MEDICARE

## 2024-11-01 DIAGNOSIS — R09.89 OTHER SPECIFIED SYMPTOMS AND SIGNS INVOLVING THE CIRCULATORY AND RESPIRATORY SYSTEMS: ICD-10-CM

## 2024-11-01 LAB
BH CV XLRA MEAS LEFT CAROTID BULB EDV: 17.2 CM/SEC
BH CV XLRA MEAS LEFT CAROTID BULB PSV: 58.6 CM/SEC
BH CV XLRA MEAS LEFT DIST CCA EDV: 19.9 CM/SEC
BH CV XLRA MEAS LEFT DIST CCA PSV: 87.2 CM/SEC
BH CV XLRA MEAS LEFT DIST ICA EDV: 19.4 CM/SEC
BH CV XLRA MEAS LEFT DIST ICA PSV: 61.7 CM/SEC
BH CV XLRA MEAS LEFT ICA/CCA RATIO: 0.84
BH CV XLRA MEAS LEFT MID ICA EDV: 16.6 CM/SEC
BH CV XLRA MEAS LEFT MID ICA PSV: 93.1 CM/SEC
BH CV XLRA MEAS LEFT PROX CCA EDV: 21.7 CM/SEC
BH CV XLRA MEAS LEFT PROX CCA PSV: 121.1 CM/SEC
BH CV XLRA MEAS LEFT PROX ECA EDV: 6.5 CM/SEC
BH CV XLRA MEAS LEFT PROX ECA PSV: 65.1 CM/SEC
BH CV XLRA MEAS LEFT PROX ICA EDV: 15.3 CM/SEC
BH CV XLRA MEAS LEFT PROX ICA PSV: 73.3 CM/SEC
BH CV XLRA MEAS LEFT VERTEBRAL A EDV: 7.7 CM/SEC
BH CV XLRA MEAS LEFT VERTEBRAL A PSV: 35.2 CM/SEC
BH CV XLRA MEAS RIGHT CAROTID BULB EDV: 17.2 CM/SEC
BH CV XLRA MEAS RIGHT CAROTID BULB PSV: 74.7 CM/SEC
BH CV XLRA MEAS RIGHT DIST CCA EDV: 15.8 CM/SEC
BH CV XLRA MEAS RIGHT DIST CCA PSV: 71.4 CM/SEC
BH CV XLRA MEAS RIGHT DIST ICA EDV: 16.2 CM/SEC
BH CV XLRA MEAS RIGHT DIST ICA PSV: 45.8 CM/SEC
BH CV XLRA MEAS RIGHT ICA/CCA RATIO: 0.57
BH CV XLRA MEAS RIGHT MID ICA EDV: 14 CM/SEC
BH CV XLRA MEAS RIGHT MID ICA PSV: 46.4 CM/SEC
BH CV XLRA MEAS RIGHT PROX CCA EDV: 18.5 CM/SEC
BH CV XLRA MEAS RIGHT PROX CCA PSV: 100.5 CM/SEC
BH CV XLRA MEAS RIGHT PROX ECA EDV: 8.7 CM/SEC
BH CV XLRA MEAS RIGHT PROX ECA PSV: 64.9 CM/SEC
BH CV XLRA MEAS RIGHT PROX ICA EDV: 12.9 CM/SEC
BH CV XLRA MEAS RIGHT PROX ICA PSV: 40.8 CM/SEC
BH CV XLRA MEAS RIGHT VERTEBRAL A EDV: 14.3 CM/SEC
BH CV XLRA MEAS RIGHT VERTEBRAL A PSV: 51.1 CM/SEC
LEFT ARM BP: NORMAL MMHG
RIGHT ARM BP: NORMAL MMHG

## 2024-11-01 PROCEDURE — 93880 EXTRACRANIAL BILAT STUDY: CPT

## 2024-12-21 DIAGNOSIS — R05.1 ACUTE COUGH: ICD-10-CM

## 2024-12-24 RX ORDER — BROMPHENIRAMINE MALEATE, PSEUDOEPHEDRINE HYDROCHLORIDE, AND DEXTROMETHORPHAN HYDROBROMIDE 2; 30; 10 MG/5ML; MG/5ML; MG/5ML
SYRUP ORAL
Qty: 473 ML | Refills: 0 | Status: SHIPPED | OUTPATIENT
Start: 2024-12-24

## 2025-03-03 ENCOUNTER — TELEPHONE (OUTPATIENT)
Dept: FAMILY MEDICINE CLINIC | Facility: CLINIC | Age: 75
End: 2025-03-03

## 2025-03-03 NOTE — TELEPHONE ENCOUNTER
Caller: Kj Claudia    Relationship: Self    Best call back number: [phone]     What medication are you requesting: SOMETHING FOR COUGH AND CONGESTION    How long have you been experiencing symptoms: STARTED TODAY    If a prescription is needed, what is your preferred pharmacy and phone number: [facility] [place] - [address] [address] - [phone]  - [phone] FX     Additional notes: PATIENT WOULD LIKE TO GET AHEAD OF SYMPTOMS BEFORE THEY GET WORSE.    PATIENT UNABLE TO SCHEDULE APPOINTMENT UNTIL NEXT WEEK SOMETIME.    CONTACT PATIENT TO ADVISE.

## 2025-03-06 NOTE — TELEPHONE ENCOUNTER
Spoke with patient and let her know medication has been sent to the pharmacy. She verbalized understanding.

## 2025-05-09 ENCOUNTER — EXTERNAL PBMM DATA (OUTPATIENT)
Dept: PHARMACY | Facility: OTHER | Age: 75
End: 2025-05-09
Payer: MEDICARE

## 2025-06-18 ENCOUNTER — OFFICE VISIT (OUTPATIENT)
Dept: FAMILY MEDICINE CLINIC | Facility: CLINIC | Age: 75
End: 2025-06-18
Payer: MEDICARE

## 2025-06-18 VITALS
HEIGHT: 63 IN | WEIGHT: 247.1 LBS | BODY MASS INDEX: 43.78 KG/M2 | DIASTOLIC BLOOD PRESSURE: 78 MMHG | OXYGEN SATURATION: 97 % | SYSTOLIC BLOOD PRESSURE: 132 MMHG | HEART RATE: 63 BPM | TEMPERATURE: 98 F

## 2025-06-18 DIAGNOSIS — R09.81 NASAL CONGESTION: ICD-10-CM

## 2025-06-18 DIAGNOSIS — R73.03 PREDIABETES: ICD-10-CM

## 2025-06-18 DIAGNOSIS — E55.9 VITAMIN D DEFICIENCY: ICD-10-CM

## 2025-06-18 DIAGNOSIS — I10 PRIMARY HYPERTENSION: ICD-10-CM

## 2025-06-18 DIAGNOSIS — J30.9 ALLERGIC RHINITIS, UNSPECIFIED SEASONALITY, UNSPECIFIED TRIGGER: ICD-10-CM

## 2025-06-18 DIAGNOSIS — D22.9 ATYPICAL MOLE: ICD-10-CM

## 2025-06-18 DIAGNOSIS — E78.5 HYPERLIPIDEMIA, UNSPECIFIED HYPERLIPIDEMIA TYPE: ICD-10-CM

## 2025-06-18 DIAGNOSIS — F41.1 GENERALIZED ANXIETY DISORDER: ICD-10-CM

## 2025-06-18 DIAGNOSIS — R06.2 WHEEZING: ICD-10-CM

## 2025-06-18 DIAGNOSIS — Z00.00 MEDICARE ANNUAL WELLNESS VISIT, SUBSEQUENT: Primary | ICD-10-CM

## 2025-06-18 RX ORDER — ALBUTEROL SULFATE 90 UG/1
2 INHALANT RESPIRATORY (INHALATION) EVERY 4 HOURS PRN
Qty: 8.5 G | Refills: 2 | Status: SHIPPED | OUTPATIENT
Start: 2025-06-18

## 2025-06-18 RX ORDER — FLUTICASONE PROPIONATE 50 MCG
2 SPRAY, SUSPENSION (ML) NASAL DAILY
Qty: 16 G | Refills: 1 | Status: SHIPPED | OUTPATIENT
Start: 2025-06-18

## 2025-06-18 RX ORDER — CETIRIZINE HYDROCHLORIDE 10 MG/1
10 TABLET ORAL DAILY
Qty: 30 TABLET | Refills: 2 | Status: SHIPPED | OUTPATIENT
Start: 2025-06-18

## 2025-06-18 NOTE — PROGRESS NOTES
Subjective   The ABCs of the Annual Wellness Visit  Medicare Wellness Visit      Claudia Underwood is a 75 y.o. patient who presents for a Medicare Wellness Visit.    The following portions of the patient's history were reviewed and   updated as appropriate: allergies, current medications, past family history, past medical history, past social history, past surgical history, and problem list.    Compared to one year ago, the patient's physical   health is the same.  Compared to one year ago, the patient's mental   health is the same.    Recent Hospitalizations:  She was not admitted to the hospital during the last year.     Current Medical Providers:  Patient Care Team:  Kimberlee Cohen APRN as PCP - General (Family Medicine)    Outpatient Medications Prior to Visit   Medication Sig Dispense Refill    amLODIPine (NORVASC) 10 MG tablet Take 1 tablet by mouth Daily.      Aspirin Low Dose 81 MG EC tablet Take 1 tablet by mouth Daily.      carbamide peroxide (Debrox) 6.5 % otic solution Administer 5 drops into both ears 2 (Two) Times a Day. 15 mL 1    carvedilol (COREG) 25 MG tablet TAKE ONE TABLET BY MOUTH TWICE DAILY @ 9AM & 5PM      cholecalciferol (VITAMIN D3) 25 MCG (1000 UT) tablet TAKE ONE TABLET BY MOUTH DAILY AT 9 AM      hydroCHLOROthiazide (HYDRODIURIL) 25 MG tablet TAKE ONE TABLET BY MOUTH DAILY AT 9 AM      losartan (COZAAR) 100 MG tablet TAKE ONE TABLET BY MOUTH DAILY AT 9 AM      ofloxacin (FLOXIN) 0.3 % otic solution Administer 5 drops to the right ear Daily. 10 mL 0    potassium chloride (K-DUR,KLOR-CON) 10 MEQ CR tablet TAKE ONE TABLET BY MOUTH TWICE DAILY @ 9AM & 5PM      pravastatin (PRAVACHOL) 40 MG tablet Take 1 tablet by mouth Daily.      albuterol sulfate  (90 Base) MCG/ACT inhaler INHALE 2 PUFFS EVERY 4 HOURS AS NEEDED FOR WHEEZING 8.5 g 1    cetirizine (zyrTEC) 10 MG tablet Take 1 tablet by mouth Daily. 30 tablet 2    fluticasone (FLONASE) 50 MCG/ACT nasal spray inhale 2 sprays IN  "EACH NOSTRIL EVERY DAY 16 g 1    hydrOXYzine (ATARAX) 25 MG tablet Take 1 tablet by mouth 3 (Three) Times a Day As Needed for Anxiety. 60 tablet 1    brompheniramine-pseudoephedrine-DM 30-2-10 MG/5ML syrup take 10 mls BY MOUTH FOUR TIMES DAILY AS NEEDED for congestion or cough 473 mL 0    Dextromethorphan-guaiFENesin  MG/20ML liquid Take 10 mg by mouth Every 6 (Six) Hours As Needed (cough and congestion). 236 mL 0    LORazepam (Ativan) 0.5 MG tablet Take 1 tablet by mouth Every 8 (Eight) Hours As Needed for Anxiety. 10 tablet 0     No facility-administered medications prior to visit.     No opioid medication identified on active medication list. I have reviewed chart for other potential  high risk medication/s and harmful drug interactions in the elderly.      Aspirin is on active medication list. Aspirin use is indicated based on review of current medical condition/s. Pros and cons of this therapy have been discussed today. Benefits of this medication outweigh potential harm.  Patient has been encouraged to continue taking this medication.  .      Patient Active Problem List   Diagnosis    Primary hypertension    Hyperlipidemia    Type 2 diabetes mellitus with hyperglycemia, without long-term current use of insulin    History of hypothyroidism    Morbid (severe) obesity due to excess calories    Allergic rhinitis    Metabolic syndrome    Hypothyroidism    Hypokalemia    Generalized anxiety disorder    Gastroesophageal reflux disease     Advance Care Planning Advance Directive is not on file.  ACP discussion was held with the patient during this visit. Patient does not have an advance directive, information provided.            Objective   Vitals:    06/18/25 1521   BP: 132/78   BP Location: Left arm   Patient Position: Sitting   Cuff Size: Adult   Pulse: 63   Temp: 98 °F (36.7 °C)   TempSrc: Oral   SpO2: 97%   Weight: 112 kg (247 lb 1.6 oz)   Height: 160 cm (63\")   PainSc: 0-No pain       Estimated body mass " "index is 43.77 kg/m² as calculated from the following:    Height as of this encounter: 160 cm (63\").    Weight as of this encounter: 112 kg (247 lb 1.6 oz).    Class 3 Severe Obesity (BMI >=40). Obesity-related health conditions include the following: hypertension, dyslipidemias, and osteoarthritis. Obesity is unchanged. BMI is is above average; BMI management plan is completed. We discussed portion control and increasing exercise.           Does the patient have evidence of cognitive impairment? No                                                                                                Health  Risk Assessment    Smoking Status:  Social History     Tobacco Use   Smoking Status Never    Passive exposure: Never   Smokeless Tobacco Never     Alcohol Consumption:  Social History     Substance and Sexual Activity   Alcohol Use Never       Fall Risk Screen  STEADI Fall Risk Assessment was completed, and patient is at LOW risk for falls.Assessment completed on:2025    Depression Screening   Little interest or pleasure in doing things? Not at all   Feeling down, depressed, or hopeless? Not at all   PHQ-2 Total Score 0      Health Habits and Functional and Cognitive Screenin/18/2025     3:34 PM   Functional & Cognitive Status   Do you have difficulty preparing food and eating? No   Do you have difficulty bathing yourself, getting dressed or grooming yourself? No   Do you have difficulty using the toilet? No   Do you have difficulty moving around from place to place? No   Do you have trouble with steps or getting out of a bed or a chair? No   Current Diet Well Balanced Diet   Dental Exam Up to date   Eye Exam Up to date   Exercise (times per week) 0 times per week   Current Exercises Include No Regular Exercise   Do you need help using the phone?  No   Are you deaf or do you have serious difficulty hearing?  No   Do you need help to go to places out of walking distance? No   Do you need help shopping? No "   Do you need help preparing meals?  No   Do you need help with housework?  No   Do you need help with laundry? No   Do you need help taking your medications? No   Do you need help managing money? No   Do you ever drive or ride in a car without wearing a seat belt? No   Have you felt unusual stress, anger or loneliness in the last month? No   Who do you live with? Alone   If you need help, do you have trouble finding someone available to you? No   Have you been bothered in the last four weeks by sexual problems? No   Do you have difficulty concentrating, remembering or making decisions? No           Age-appropriate Screening Schedule:  Refer to the list below for future screening recommendations based on patient's age, sex and/or medical conditions. Orders for these recommended tests are listed in the plan section. The patient has been provided with a written plan.    Health Maintenance List  Health Maintenance   Topic Date Due    DXA SCAN  Never done    DIABETIC FOOT EXAM  Never done    DIABETIC EYE EXAM  Never done    URINE MICROALBUMIN-CREATININE RATIO (uACR)  Never done    Pneumococcal Vaccine 50+ (1 of 2 - PCV) Never done    TDAP/TD VACCINES (1 - Tdap) Never done    MAMMOGRAM  Never done    COLORECTAL CANCER SCREENING  Never done    ZOSTER VACCINE (1 of 2) Never done    HEPATITIS C SCREENING  Never done    HEMOGLOBIN A1C  07/30/2024    COVID-19 Vaccine (5 - 2024-25 season) 09/01/2024    LIPID PANEL  01/30/2025    INFLUENZA VACCINE  07/01/2025    ANNUAL WELLNESS VISIT  06/18/2026                                                                                                                                                CMS Preventative Services Quick Reference  Risk Factors Identified During Encounter  Immunizations Discussed/Encouraged: Tdap and Shingrix  Inactivity/Sedentary: Patient was advised to exercise at least 150 minutes a week per CDC recommendations.  Polypharmacy: Medication List reviewed and  Medications are appropriate for patient  Dental Screening Recommended  Vision Screening Recommended    The above risks/problems have been discussed with the patient.  Pertinent information has been shared with the patient in the After Visit Summary.  An After Visit Summary and PPPS were made available to the patient.    Follow Up:   Next Medicare Wellness visit to be scheduled in 1 year.          Additional E&M Note during same encounter follows:  Patient has multiple medical problems which are significant and separately identifiable that require additional work above and beyond the Medicare Wellness Visit.      Chief Complaint  Medicare Wellness-subsequent, Hypertension, Hyperlipidemia, Allergies, and Nasal Congestion    Claudia Underwood is a 75 y.o. female who presents to [facility]     History of Present Illness  The patient presents for evaluation of hypertension, hyperlipidemia, prediabetes, allergies, wheezing, and a mole.    She continues to be under the care of her cardiologist, Dr. Pimentel, with no recent changes in her treatment regimen. Current medications include pravastatin for cholesterol management, amlodipine 10 mg and hydrochlorothiazide 25 mg for blood pressure control, daily aspirin, and potassium supplementation. She reports feeling well overall, with the exception of some ear discomfort, which she attributes to weather changes. No recent tests or procedures have been performed.    She has a history of prediabetes but has never been diagnosed with diabetes.    She is currently not on any thyroid medication. She was previously on thyroid medication, but an ultrasound of her thyroid yielded normal results, leading to the discontinuation of the medication.    She has been experiencing wheezing and is seeking a refill of her albuterol inhaler. Eye discomfort is also reported.    She has noticed an increase in the size of a mole and is considering its removal. The mole  "does not cause any discomfort.    She has been using Flonase for nasal congestion and is requesting a prescription for Zyrtec.    She declines to undergo mammogram and colonoscopy. Vitamin D supplements are continued.    FAMILY HISTORY  Her father had a mole similar to hers.    Objective   Vital Signs:   Vitals:    06/18/25 1521   BP: 132/78   BP Location: Left arm   Patient Position: Sitting   Cuff Size: Adult   Pulse: 63   Temp: 98 °F (36.7 °C)   TempSrc: Oral   SpO2: 97%   Weight: 112 kg (247 lb 1.6 oz)   Height: 160 cm (63\")   PainSc: 0-No pain       Wt Readings from Last 3 Encounters:   06/18/25 112 kg (247 lb 1.6 oz)   06/06/24 112 kg (247 lb 9.6 oz)   11/29/23 116 kg (255 lb 14.4 oz)     BP Readings from Last 3 Encounters:   06/18/25 132/78   06/06/24 138/88   11/29/23 136/76       Physical Exam  Vitals reviewed.   Constitutional:       Appearance: Normal appearance. She is well-developed. She is morbidly obese.   HENT:      Head: Normocephalic and atraumatic.   Eyes:      Conjunctiva/sclera: Conjunctivae normal.      Pupils: Pupils are equal, round, and reactive to light.   Cardiovascular:      Rate and Rhythm: Normal rate and regular rhythm.      Heart sounds: No murmur heard.     No friction rub. No gallop.   Pulmonary:      Effort: Pulmonary effort is normal.      Breath sounds: Normal breath sounds. No wheezing or rhonchi.   Abdominal:      General: Bowel sounds are normal. There is no distension.      Palpations: Abdomen is soft.      Tenderness: There is no abdominal tenderness.   Skin:     General: Skin is warm and dry.   Neurological:      Mental Status: She is alert and oriented to person, place, and time.      Cranial Nerves: No cranial nerve deficit.   Psychiatric:         Mood and Affect: Mood and affect normal.         Behavior: Behavior normal.         Thought Content: Thought content normal.         Judgment: Judgment normal.         Physical Exam  Ears: Fluid behind the eardrum  Respiratory: " Clear to auscultation, no wheezing, rales or rhonchi  Cardiovascular: Normal rate and regular rhythm    The following data was reviewed by HUA Senior on     Results          Assessment & Plan   Diagnoses and all orders for this visit:    1. Medicare annual wellness visit, subsequent (Primary)    2. Primary hypertension  -     CBC & Differential  -     Comprehensive Metabolic Panel  -     Lipid Panel    3. Hyperlipidemia, unspecified hyperlipidemia type  -     CBC & Differential  -     Comprehensive Metabolic Panel  -     Lipid Panel    4. Generalized anxiety disorder    5. Prediabetes  -     CBC & Differential  -     Comprehensive Metabolic Panel  -     Lipid Panel  -     Hemoglobin A1c  -     Microalbumin / Creatinine Urine Ratio - Urine, Clean Catch    6. Vitamin D deficiency  -     Vitamin D 25 hydroxy    7. Allergic rhinitis, unspecified seasonality, unspecified trigger  -     cetirizine (zyrTEC) 10 MG tablet; Take 1 tablet by mouth Daily.  Dispense: 30 tablet; Refill: 2  -     fluticasone (FLONASE) 50 MCG/ACT nasal spray; 2 sprays by Each Nare route Daily.  Dispense: 16 g; Refill: 1    8. Nasal congestion  -     cetirizine (zyrTEC) 10 MG tablet; Take 1 tablet by mouth Daily.  Dispense: 30 tablet; Refill: 2    9. Wheezing  -     albuterol sulfate  (90 Base) MCG/ACT inhaler; Inhale 2 puffs Every 4 (Four) Hours As Needed for Wheezing. for wheezing  Dispense: 8.5 g; Refill: 2    10. Atypical mole  -     Ambulatory Referral to Dermatology        Assessment & Plan  1. Hypertension.  - Blood pressure readings today are 132/78 mmHg.  - Current medications include amlodipine 10 mg and hydrochlorothiazide 25 mg.  - Continue current medication regimen.    2. Hyperlipidemia.  - Currently managed with pravastatin.  - Comprehensive panel of laboratory tests ordered to assess kidney function, liver function, blood counts, blood sugar levels, cholesterol levels, and vitamin D levels.  - Cholesterol  levels were good during the last check.  - Continue current medication regimen.    3. Prediabetes.  - History of prediabetes.  - Comprehensive panel of laboratory tests ordered to assess kidney function, liver function, blood counts, blood sugar levels, cholesterol levels, and vitamin D levels.  - No recent diabetic symptoms reported.  - Continue monitoring blood sugar levels.    4. Allergies.  - Reports ear congestion and eye irritation.  - Advised to take Zyrtec daily and use Flonase 1-2 times a day.  - Symptoms consistent with allergies.  - If symptoms persist, return for further evaluation.    5. Wheezing.  - Occasional wheezing reported.  - Lungs sound clear today.  - Advised to use albuterol inhaler as needed.  - If symptoms persist, return for further evaluation.    6. Mole.  - Mole appears to be enlarging.  - Referral to dermatology for further evaluation and potential biopsy.  - No immediate concerns noted.  - Dermatology consultation scheduled.    7. Health maintenance.  - Declines mammogram and colonoscopy.  - Comprehensive panel of laboratory tests ordered to assess kidney function, liver function, blood counts, blood sugar levels, cholesterol levels, and vitamin D levels.  - Vitamin D levels to be checked.  - Continue routine health monitoring.     Class 3 Severe Obesity (BMI >=40). Obesity-related health conditions include the following: hypertension, dyslipidemias, and osteoarthritis. Obesity is unchanged. BMI is is above average; BMI management plan is completed. We discussed portion control and increasing exercise.    Patient has been erroneously marked as diabetic. Based on the available clinical information, she does not have diabetes and should therefore be excluded from diabetic health maintenance and quality measures for the remainder of the reporting period.       FOLLOW UP  Return in about 3 months (around 9/18/2025), or if symptoms worsen or fail to improve, for Next scheduled follow  up.  Patient was given instructions and counseling regarding her condition or for health maintenance advice. Please see specific information pulled into the AVS if appropriate.     Patient or patient representative verbalized consent for the use of Ambient Listening during the visit with  HUA Senior for chart documentation. 6/18/2025  15:26 EDT    HUA Senior  06/18/25  16:25 EDT

## 2025-08-01 ENCOUNTER — LAB (OUTPATIENT)
Dept: LAB | Facility: HOSPITAL | Age: 75
End: 2025-08-01
Payer: MEDICARE

## 2025-08-01 LAB
25(OH)D3 SERPL-MCNC: 39.1 NG/ML (ref 30–100)
ALBUMIN SERPL-MCNC: 3.8 G/DL (ref 3.5–5.2)
ALBUMIN/GLOB SERPL: 1.2 G/DL
ALP SERPL-CCNC: 90 U/L (ref 39–117)
ALT SERPL W P-5'-P-CCNC: 5 U/L (ref 1–33)
ANION GAP SERPL CALCULATED.3IONS-SCNC: 11.1 MMOL/L (ref 5–15)
AST SERPL-CCNC: 13 U/L (ref 1–32)
BASOPHILS # BLD AUTO: 0.05 10*3/MM3 (ref 0–0.2)
BASOPHILS NFR BLD AUTO: 1 % (ref 0–1.5)
BILIRUB SERPL-MCNC: 0.4 MG/DL (ref 0–1.2)
BUN SERPL-MCNC: 15 MG/DL (ref 8–23)
BUN/CREAT SERPL: 15.8 (ref 7–25)
CALCIUM SPEC-SCNC: 9.9 MG/DL (ref 8.6–10.5)
CHLORIDE SERPL-SCNC: 102 MMOL/L (ref 98–107)
CHOLEST SERPL-MCNC: 157 MG/DL (ref 0–200)
CO2 SERPL-SCNC: 26.9 MMOL/L (ref 22–29)
CREAT SERPL-MCNC: 0.95 MG/DL (ref 0.57–1)
DEPRECATED RDW RBC AUTO: 44 FL (ref 37–54)
EGFRCR SERPLBLD CKD-EPI 2021: 63.4 ML/MIN/1.73
EOSINOPHIL # BLD AUTO: 0.42 10*3/MM3 (ref 0–0.4)
EOSINOPHIL NFR BLD AUTO: 8 % (ref 0.3–6.2)
ERYTHROCYTE [DISTWIDTH] IN BLOOD BY AUTOMATED COUNT: 12.8 % (ref 12.3–15.4)
GLOBULIN UR ELPH-MCNC: 3.2 GM/DL
GLUCOSE SERPL-MCNC: 117 MG/DL (ref 65–99)
HBA1C MFR BLD: 5.9 % (ref 4.8–5.6)
HCT VFR BLD AUTO: 41.2 % (ref 34–46.6)
HDLC SERPL-MCNC: 59 MG/DL (ref 40–60)
HGB BLD-MCNC: 13 G/DL (ref 12–15.9)
IMM GRANULOCYTES # BLD AUTO: 0.02 10*3/MM3 (ref 0–0.05)
IMM GRANULOCYTES NFR BLD AUTO: 0.4 % (ref 0–0.5)
LDLC SERPL CALC-MCNC: 88 MG/DL (ref 0–100)
LDLC/HDLC SERPL: 1.51 {RATIO}
LYMPHOCYTES # BLD AUTO: 2.14 10*3/MM3 (ref 0.7–3.1)
LYMPHOCYTES NFR BLD AUTO: 41 % (ref 19.6–45.3)
MCH RBC QN AUTO: 29.3 PG (ref 26.6–33)
MCHC RBC AUTO-ENTMCNC: 31.6 G/DL (ref 31.5–35.7)
MCV RBC AUTO: 93 FL (ref 79–97)
MONOCYTES # BLD AUTO: 0.62 10*3/MM3 (ref 0.1–0.9)
MONOCYTES NFR BLD AUTO: 11.9 % (ref 5–12)
NEUTROPHILS NFR BLD AUTO: 1.97 10*3/MM3 (ref 1.7–7)
NEUTROPHILS NFR BLD AUTO: 37.7 % (ref 42.7–76)
PLATELET # BLD AUTO: 277 10*3/MM3 (ref 140–450)
PMV BLD AUTO: 10.7 FL (ref 6–12)
POTASSIUM SERPL-SCNC: 4.3 MMOL/L (ref 3.5–5.2)
PROT SERPL-MCNC: 7 G/DL (ref 6–8.5)
RBC # BLD AUTO: 4.43 10*6/MM3 (ref 3.77–5.28)
SODIUM SERPL-SCNC: 140 MMOL/L (ref 136–145)
TRIGL SERPL-MCNC: 45 MG/DL (ref 0–150)
VLDLC SERPL-MCNC: 10 MG/DL (ref 5–40)
WBC NRBC COR # BLD AUTO: 5.22 10*3/MM3 (ref 3.4–10.8)

## 2025-08-01 PROCEDURE — 80061 LIPID PANEL: CPT

## 2025-08-01 PROCEDURE — 85025 COMPLETE CBC W/AUTO DIFF WBC: CPT

## 2025-08-01 PROCEDURE — 82306 VITAMIN D 25 HYDROXY: CPT

## 2025-08-01 PROCEDURE — 80053 COMPREHEN METABOLIC PANEL: CPT

## 2025-08-01 PROCEDURE — 83036 HEMOGLOBIN GLYCOSYLATED A1C: CPT
